# Patient Record
Sex: FEMALE | Race: WHITE | NOT HISPANIC OR LATINO
[De-identification: names, ages, dates, MRNs, and addresses within clinical notes are randomized per-mention and may not be internally consistent; named-entity substitution may affect disease eponyms.]

---

## 2017-05-04 ENCOUNTER — RESULT REVIEW (OUTPATIENT)
Age: 57
End: 2017-05-04

## 2020-08-13 ENCOUNTER — OUTPATIENT (OUTPATIENT)
Dept: OUTPATIENT SERVICES | Facility: HOSPITAL | Age: 60
LOS: 1 days | End: 2020-08-13
Payer: COMMERCIAL

## 2020-08-13 ENCOUNTER — APPOINTMENT (OUTPATIENT)
Dept: MAMMOGRAPHY | Facility: IMAGING CENTER | Age: 60
End: 2020-08-13
Payer: COMMERCIAL

## 2020-08-13 ENCOUNTER — APPOINTMENT (OUTPATIENT)
Dept: ULTRASOUND IMAGING | Facility: IMAGING CENTER | Age: 60
End: 2020-08-13
Payer: COMMERCIAL

## 2020-08-13 DIAGNOSIS — Z00.8 ENCOUNTER FOR OTHER GENERAL EXAMINATION: ICD-10-CM

## 2020-08-13 PROCEDURE — G0279: CPT | Mod: 26

## 2020-08-13 PROCEDURE — 77065 DX MAMMO INCL CAD UNI: CPT

## 2020-08-13 PROCEDURE — 77065 DX MAMMO INCL CAD UNI: CPT | Mod: 26,LT

## 2020-08-13 PROCEDURE — 76642 ULTRASOUND BREAST LIMITED: CPT | Mod: 26,LT

## 2020-08-13 PROCEDURE — 76642 ULTRASOUND BREAST LIMITED: CPT

## 2020-08-13 PROCEDURE — G0279: CPT

## 2021-03-16 ENCOUNTER — APPOINTMENT (OUTPATIENT)
Dept: OBGYN | Facility: CLINIC | Age: 61
End: 2021-03-16

## 2021-03-18 ENCOUNTER — APPOINTMENT (OUTPATIENT)
Dept: OBGYN | Facility: CLINIC | Age: 61
End: 2021-03-18
Payer: COMMERCIAL

## 2021-03-18 VITALS
HEIGHT: 61 IN | WEIGHT: 174 LBS | DIASTOLIC BLOOD PRESSURE: 82 MMHG | SYSTOLIC BLOOD PRESSURE: 122 MMHG | BODY MASS INDEX: 32.85 KG/M2

## 2021-03-18 DIAGNOSIS — Z01.411 ENCOUNTER FOR GYNECOLOGICAL EXAMINATION (GENERAL) (ROUTINE) WITH ABNORMAL FINDINGS: ICD-10-CM

## 2021-03-18 DIAGNOSIS — N63.0 UNSPECIFIED LUMP IN UNSPECIFIED BREAST: ICD-10-CM

## 2021-03-18 DIAGNOSIS — N90.5 ATROPHY OF VULVA: ICD-10-CM

## 2021-03-18 LAB
BILIRUB UR QL STRIP: NORMAL
CLARITY UR: CLEAR
COLLECTION METHOD: NORMAL
GLUCOSE UR-MCNC: NORMAL
HCG UR QL: 0.2 EU/DL
HGB UR QL STRIP.AUTO: NORMAL
KETONES UR-MCNC: NORMAL
LEUKOCYTE ESTERASE UR QL STRIP: NORMAL
NITRITE UR QL STRIP: NORMAL
PH UR STRIP: 6
PROT UR STRIP-MCNC: NORMAL
SP GR UR STRIP: 1.01

## 2021-03-18 PROCEDURE — 99072 ADDL SUPL MATRL&STAF TM PHE: CPT

## 2021-03-18 PROCEDURE — 77080 DXA BONE DENSITY AXIAL: CPT

## 2021-03-18 PROCEDURE — 82270 OCCULT BLOOD FECES: CPT

## 2021-03-18 PROCEDURE — 99396 PREV VISIT EST AGE 40-64: CPT | Mod: 25

## 2021-03-18 PROCEDURE — 99214 OFFICE O/P EST MOD 30 MIN: CPT | Mod: 25

## 2021-03-18 PROCEDURE — 81003 URINALYSIS AUTO W/O SCOPE: CPT | Mod: NC,QW

## 2021-03-20 LAB
BACTERIA UR CULT: NORMAL
HPV HIGH+LOW RISK DNA PNL CVX: NOT DETECTED
URINE CYTOLOGY: NORMAL

## 2021-03-22 LAB — CYTOLOGY CVX/VAG DOC THIN PREP: ABNORMAL

## 2021-03-31 ENCOUNTER — TRANSCRIPTION ENCOUNTER (OUTPATIENT)
Age: 61
End: 2021-03-31

## 2021-04-01 ENCOUNTER — APPOINTMENT (OUTPATIENT)
Dept: MAMMOGRAPHY | Facility: CLINIC | Age: 61
End: 2021-04-01
Payer: COMMERCIAL

## 2021-04-01 ENCOUNTER — APPOINTMENT (OUTPATIENT)
Dept: ULTRASOUND IMAGING | Facility: CLINIC | Age: 61
End: 2021-04-01
Payer: COMMERCIAL

## 2021-04-01 ENCOUNTER — RESULT REVIEW (OUTPATIENT)
Age: 61
End: 2021-04-01

## 2021-04-01 ENCOUNTER — OUTPATIENT (OUTPATIENT)
Dept: OUTPATIENT SERVICES | Facility: HOSPITAL | Age: 61
LOS: 1 days | End: 2021-04-01
Payer: COMMERCIAL

## 2021-04-01 DIAGNOSIS — N63.20 UNSPECIFIED LUMP IN THE LEFT BREAST, UNSPECIFIED QUADRANT: ICD-10-CM

## 2021-04-01 DIAGNOSIS — Z00.8 ENCOUNTER FOR OTHER GENERAL EXAMINATION: ICD-10-CM

## 2021-04-01 PROCEDURE — G0279: CPT

## 2021-04-01 PROCEDURE — G0279: CPT | Mod: 26

## 2021-04-01 PROCEDURE — 76642 ULTRASOUND BREAST LIMITED: CPT | Mod: 26,LT

## 2021-04-01 PROCEDURE — 77066 DX MAMMO INCL CAD BI: CPT | Mod: 26

## 2021-04-01 PROCEDURE — 77066 DX MAMMO INCL CAD BI: CPT

## 2021-04-01 PROCEDURE — 76642 ULTRASOUND BREAST LIMITED: CPT

## 2021-04-30 ENCOUNTER — APPOINTMENT (OUTPATIENT)
Dept: OBGYN | Facility: CLINIC | Age: 61
End: 2021-04-30

## 2021-07-04 ENCOUNTER — EMERGENCY (EMERGENCY)
Facility: HOSPITAL | Age: 61
LOS: 1 days | Discharge: HOME | End: 2021-07-04
Attending: STUDENT IN AN ORGANIZED HEALTH CARE EDUCATION/TRAINING PROGRAM
Payer: COMMERCIAL

## 2021-07-04 ENCOUNTER — EMERGENCY (EMERGENCY)
Facility: HOSPITAL | Age: 61
LOS: 1 days | Discharge: ANOTHER HOSPITAL | End: 2021-07-04
Attending: EMERGENCY MEDICINE | Admitting: EMERGENCY MEDICINE
Payer: COMMERCIAL

## 2021-07-04 VITALS
TEMPERATURE: 98 F | SYSTOLIC BLOOD PRESSURE: 151 MMHG | OXYGEN SATURATION: 97 % | HEART RATE: 102 BPM | WEIGHT: 179.9 LBS | DIASTOLIC BLOOD PRESSURE: 76 MMHG | HEIGHT: 62 IN | RESPIRATION RATE: 18 BRPM

## 2021-07-04 VITALS
DIASTOLIC BLOOD PRESSURE: 69 MMHG | HEART RATE: 75 BPM | OXYGEN SATURATION: 100 % | TEMPERATURE: 98 F | RESPIRATION RATE: 16 BRPM | SYSTOLIC BLOOD PRESSURE: 139 MMHG

## 2021-07-04 VITALS
DIASTOLIC BLOOD PRESSURE: 83 MMHG | SYSTOLIC BLOOD PRESSURE: 144 MMHG | OXYGEN SATURATION: 94 % | WEIGHT: 179.9 LBS | HEIGHT: 62 IN | RESPIRATION RATE: 18 BRPM | HEART RATE: 95 BPM | TEMPERATURE: 98 F

## 2021-07-04 DIAGNOSIS — Z98.891 HISTORY OF UTERINE SCAR FROM PREVIOUS SURGERY: Chronic | ICD-10-CM

## 2021-07-04 LAB
ALBUMIN SERPL ELPH-MCNC: 4.1 G/DL — SIGNIFICANT CHANGE UP (ref 3.3–5)
ALP SERPL-CCNC: 106 U/L — SIGNIFICANT CHANGE UP (ref 30–120)
ALT FLD-CCNC: 167 U/L DA — HIGH (ref 10–60)
ANION GAP SERPL CALC-SCNC: 14 MMOL/L — SIGNIFICANT CHANGE UP (ref 5–17)
APPEARANCE UR: CLEAR — SIGNIFICANT CHANGE UP
APTT BLD: 31.6 SEC — SIGNIFICANT CHANGE UP (ref 27.5–35.5)
AST SERPL-CCNC: 139 U/L — HIGH (ref 10–40)
BASOPHILS # BLD AUTO: 0.12 K/UL — SIGNIFICANT CHANGE UP (ref 0–0.2)
BASOPHILS NFR BLD AUTO: 0.6 % — SIGNIFICANT CHANGE UP (ref 0–2)
BILIRUB SERPL-MCNC: 1.4 MG/DL — HIGH (ref 0.2–1.2)
BILIRUB UR-MCNC: NEGATIVE — SIGNIFICANT CHANGE UP
BUN SERPL-MCNC: 16 MG/DL — SIGNIFICANT CHANGE UP (ref 7–23)
CALCIUM SERPL-MCNC: 9.8 MG/DL — SIGNIFICANT CHANGE UP (ref 8.4–10.5)
CHLORIDE SERPL-SCNC: 100 MMOL/L — SIGNIFICANT CHANGE UP (ref 96–108)
CO2 SERPL-SCNC: 21 MMOL/L — LOW (ref 22–31)
COLOR SPEC: YELLOW — SIGNIFICANT CHANGE UP
CREAT SERPL-MCNC: 0.56 MG/DL — SIGNIFICANT CHANGE UP (ref 0.5–1.3)
DIFF PNL FLD: ABNORMAL
EOSINOPHIL # BLD AUTO: 0.15 K/UL — SIGNIFICANT CHANGE UP (ref 0–0.5)
EOSINOPHIL NFR BLD AUTO: 0.8 % — SIGNIFICANT CHANGE UP (ref 0–6)
GLUCOSE SERPL-MCNC: 190 MG/DL — HIGH (ref 70–99)
GLUCOSE UR QL: 1000 MG/DL
HCT VFR BLD CALC: 48.8 % — HIGH (ref 34.5–45)
HGB BLD-MCNC: 17.2 G/DL — HIGH (ref 11.5–15.5)
IMM GRANULOCYTES NFR BLD AUTO: 1.1 % — SIGNIFICANT CHANGE UP (ref 0–1.5)
INR BLD: 0.93 RATIO — SIGNIFICANT CHANGE UP (ref 0.88–1.16)
KETONES UR-MCNC: ABNORMAL
LEUKOCYTE ESTERASE UR-ACNC: ABNORMAL
LIDOCAIN IGE QN: 121 U/L — SIGNIFICANT CHANGE UP (ref 73–393)
LYMPHOCYTES # BLD AUTO: 1.99 K/UL — SIGNIFICANT CHANGE UP (ref 1–3.3)
LYMPHOCYTES # BLD AUTO: 10.3 % — LOW (ref 13–44)
MCHC RBC-ENTMCNC: 32.9 PG — SIGNIFICANT CHANGE UP (ref 27–34)
MCHC RBC-ENTMCNC: 35.2 GM/DL — SIGNIFICANT CHANGE UP (ref 32–36)
MCV RBC AUTO: 93.3 FL — SIGNIFICANT CHANGE UP (ref 80–100)
MONOCYTES # BLD AUTO: 1.87 K/UL — HIGH (ref 0–0.9)
MONOCYTES NFR BLD AUTO: 9.7 % — SIGNIFICANT CHANGE UP (ref 2–14)
NEUTROPHILS # BLD AUTO: 15.03 K/UL — HIGH (ref 1.8–7.4)
NEUTROPHILS NFR BLD AUTO: 77.5 % — HIGH (ref 43–77)
NITRITE UR-MCNC: NEGATIVE — SIGNIFICANT CHANGE UP
NRBC # BLD: 0 /100 WBCS — SIGNIFICANT CHANGE UP (ref 0–0)
PH UR: 5 — SIGNIFICANT CHANGE UP (ref 5–8)
PLATELET # BLD AUTO: 301 K/UL — SIGNIFICANT CHANGE UP (ref 150–400)
POTASSIUM SERPL-MCNC: 4.5 MMOL/L — SIGNIFICANT CHANGE UP (ref 3.5–5.3)
POTASSIUM SERPL-SCNC: 4.5 MMOL/L — SIGNIFICANT CHANGE UP (ref 3.5–5.3)
PROT SERPL-MCNC: 7.6 G/DL — SIGNIFICANT CHANGE UP (ref 6–8.3)
PROT UR-MCNC: 30 MG/DL
PROTHROM AB SERPL-ACNC: 11.3 SEC — SIGNIFICANT CHANGE UP (ref 10.6–13.6)
RBC # BLD: 5.23 M/UL — HIGH (ref 3.8–5.2)
RBC # FLD: 12 % — SIGNIFICANT CHANGE UP (ref 10.3–14.5)
SARS-COV-2 RNA SPEC QL NAA+PROBE: SIGNIFICANT CHANGE UP
SODIUM SERPL-SCNC: 135 MMOL/L — SIGNIFICANT CHANGE UP (ref 135–145)
SP GR SPEC: 1.01 — SIGNIFICANT CHANGE UP (ref 1.01–1.02)
UROBILINOGEN FLD QL: NEGATIVE MG/DL — SIGNIFICANT CHANGE UP
WBC # BLD: 19.37 K/UL — HIGH (ref 3.8–10.5)
WBC # FLD AUTO: 19.37 K/UL — HIGH (ref 3.8–10.5)

## 2021-07-04 PROCEDURE — 71260 CT THORAX DX C+: CPT

## 2021-07-04 PROCEDURE — 85730 THROMBOPLASTIN TIME PARTIAL: CPT

## 2021-07-04 PROCEDURE — 99285 EMERGENCY DEPT VISIT HI MDM: CPT | Mod: 25

## 2021-07-04 PROCEDURE — 83690 ASSAY OF LIPASE: CPT

## 2021-07-04 PROCEDURE — 99284 EMERGENCY DEPT VISIT MOD MDM: CPT

## 2021-07-04 PROCEDURE — 99285 EMERGENCY DEPT VISIT HI MDM: CPT

## 2021-07-04 PROCEDURE — 81001 URINALYSIS AUTO W/SCOPE: CPT

## 2021-07-04 PROCEDURE — 70450 CT HEAD/BRAIN W/O DYE: CPT | Mod: 26

## 2021-07-04 PROCEDURE — 70450 CT HEAD/BRAIN W/O DYE: CPT

## 2021-07-04 PROCEDURE — 71260 CT THORAX DX C+: CPT | Mod: 26,MG

## 2021-07-04 PROCEDURE — 80053 COMPREHEN METABOLIC PANEL: CPT

## 2021-07-04 PROCEDURE — 87635 SARS-COV-2 COVID-19 AMP PRB: CPT

## 2021-07-04 PROCEDURE — 36415 COLL VENOUS BLD VENIPUNCTURE: CPT

## 2021-07-04 PROCEDURE — 96360 HYDRATION IV INFUSION INIT: CPT | Mod: XU

## 2021-07-04 PROCEDURE — 74177 CT ABD & PELVIS W/CONTRAST: CPT

## 2021-07-04 PROCEDURE — 74177 CT ABD & PELVIS W/CONTRAST: CPT | Mod: 26,MG

## 2021-07-04 PROCEDURE — 72125 CT NECK SPINE W/O DYE: CPT | Mod: 26

## 2021-07-04 PROCEDURE — G1004: CPT

## 2021-07-04 PROCEDURE — 85610 PROTHROMBIN TIME: CPT

## 2021-07-04 PROCEDURE — 85025 COMPLETE CBC W/AUTO DIFF WBC: CPT

## 2021-07-04 PROCEDURE — 72125 CT NECK SPINE W/O DYE: CPT

## 2021-07-04 RX ORDER — SODIUM CHLORIDE 9 MG/ML
1000 INJECTION INTRAMUSCULAR; INTRAVENOUS; SUBCUTANEOUS ONCE
Refills: 0 | Status: COMPLETED | OUTPATIENT
Start: 2021-07-04 | End: 2021-07-04

## 2021-07-04 RX ORDER — TRAMADOL HYDROCHLORIDE 50 MG/1
25 TABLET ORAL ONCE
Refills: 0 | Status: DISCONTINUED | OUTPATIENT
Start: 2021-07-04 | End: 2021-07-04

## 2021-07-04 RX ORDER — ACETAMINOPHEN 500 MG
650 TABLET ORAL ONCE
Refills: 0 | Status: COMPLETED | OUTPATIENT
Start: 2021-07-04 | End: 2021-07-04

## 2021-07-04 RX ORDER — LIDOCAINE 4 G/100G
1 CREAM TOPICAL ONCE
Refills: 0 | Status: COMPLETED | OUTPATIENT
Start: 2021-07-04 | End: 2021-07-04

## 2021-07-04 RX ADMIN — Medication 650 MILLIGRAM(S): at 16:05

## 2021-07-04 RX ADMIN — SODIUM CHLORIDE 1000 MILLILITER(S): 9 INJECTION INTRAMUSCULAR; INTRAVENOUS; SUBCUTANEOUS at 18:15

## 2021-07-04 RX ADMIN — LIDOCAINE 1 PATCH: 4 CREAM TOPICAL at 20:47

## 2021-07-04 RX ADMIN — Medication 650 MILLIGRAM(S): at 22:20

## 2021-07-04 RX ADMIN — Medication 650 MILLIGRAM(S): at 18:15

## 2021-07-04 RX ADMIN — SODIUM CHLORIDE 1000 MILLILITER(S): 9 INJECTION INTRAMUSCULAR; INTRAVENOUS; SUBCUTANEOUS at 17:15

## 2021-07-04 NOTE — ED PROVIDER NOTE - ATTENDING CONTRIBUTION TO CARE
62 yo f who has hx of diabetes, was on the steps of a ladder into her attic.  the ladder gave way as she was on the top of it and fell to the floor falling approx 7 feet.  she hit her head and r side. no loc no sob but has pain in r ribs and pain in her head. no abd pain no leg pain no hip pain  pmd dr caleb krause  on eval  large bmi pt who is awake alert and uncomfortable with just moving around on theexam stretcher  heent small cephalohematoma occiput  neck supple no pain no tender full rom no stingers  cor rrr   chest no pain on ap compression but there is tendre to r side, bs are equal and full no sq air no crepitance no contusions noted  abd soft nt nd no hsm no cvat  ext pt has full rom all ext no focal deformity or injury pelvis is stable  neuro gcs=15 normal  skin no contusions abrasion  plan:  trauma ct of head neck chest labs pain control re evaluation ekg xray ua

## 2021-07-04 NOTE — ED ADULT NURSE NOTE - OBJECTIVE STATEMENT
pt comes to ED s/p fall from attic steps hitting the back of her head. Pt is unsure if she had LOC. pt c.o Right flank manjeet worse with movement. pt currently on 81 ASA daily. + abrasion to left leg. pt denies cp, sob, abd pain, vomiting, numbness/weakness, or any other complaints.

## 2021-07-04 NOTE — ED ADULT TRIAGE NOTE - CHIEF COMPLAINT QUOTE
" I was climbing an attic ladder when it came off and I fell down " Pt sent from an urgent care and sent in for further evaluation for right middle back pain No LOC ? (+) small laceration left upper ankle area (+) right upper occipital contusion

## 2021-07-04 NOTE — CONSULT NOTE ADULT - ASSESSMENT
61F presenting to the ED s/p mechanically fall from ladder found to have right sided 9th and 12th rib fractures along with right sided L1 and L2 transverse process fracture.     PLAN:  - Given patient's age, radiographic imaging findings and minimal secondary survey findings, patient would benefit from a trial of pain control medication and reassessment of pain tolerance.  - Patient should trial PO tramadol to assess if patient feels comfortable with being discharged with PO pain medications.  - If pain is not well controlled with PO tramadol, then would admit patient for IV pain medication for pain management until patient's pain can be controlled with PO pain medication.    Discussed with attending surgeon Dr. Benson.    ORLANDO Parsons, PGY-2   [facility]   Acute Care Surgery   [phone]

## 2021-07-04 NOTE — ED PROVIDER NOTE - OBJECTIVE STATEMENT
62 y/o female with PMHx DM presents today due to fall. pt reports she going up attic steps in which she fell about 6 feet. pt reports she hit the back of her head, uncertain of LOC. pt reports she went to urgent care and was advised to come to ED. pt notes pain to right back, aching, radiating around right ribs, worse with movement/touch/breathing, and currently 8/10. pt currently on baby ASA daily. pt notes abrasion to left leg. pt denies cp, sob, abd pain, vomiting, numbness/weakness, or any other complaints. 60 y/o female with PMHx DM presents today due to fall. pt reports she going up attic steps in which the ladder broke and she fell about 6 feet. pt reports she hit the back of her head, uncertain of LOC. pt reports she went to urgent care and was advised to come to ED. pt notes pain to right back, aching, radiating around right ribs, worse with movement/touch/breathing, and currently 8/10. pt currently on baby ASA daily. pt notes abrasion to left leg. pt denies cp, sob, abd pain, vomiting, numbness/weakness, or any other complaints.

## 2021-07-04 NOTE — ED PROVIDER NOTE - PROGRESS NOTE DETAILS
discussed with neurosurg trauma Jaylene and trauma doctor Presley from [facility], accepting transfer.

## 2021-07-04 NOTE — ED PROVIDER NOTE - CARE PROVIDER_API CALL
Han Benson (MD)  Surgery; Surgical Critical Care  [address], [address] [address]  Phone: [phone]  Fax: [phone]  Established Patient  Follow Up Time: Routine

## 2021-07-04 NOTE — ED PROVIDER NOTE - CLINICAL SUMMARY MEDICAL DECISION MAKING FREE TEXT BOX
Deniz Gonzalez, PGY3: 61 year old female transfer from [facility] for fall from 6ft, found to have 2 rib fx and L1-2 TP fx. Neuro intact, ambulating. Plan for neurosurgery and trauma consult, pain control, reassess for dispo.

## 2021-07-04 NOTE — ED PROVIDER NOTE - ATTENDING CONTRIBUTION TO CARE
Chief Complaint   Patient presents with   • Ear Pain     right started last night, denies fevers         HPI:  Crow is a 2 year old year old male who presents with a 1-2 day h/o ear pain. with  a previous history of frequent ear infections. Associated symptoms include mom and dad state no associated symptoms. There has either been no use or no improvement from the use of OTC medications.    ROS: Negative with the exceptions listed above    Vitals:    05/31/18 1558   Pulse: 93   Resp: 22   Temp: 97.9 °F (36.6 °C)     PHYSICAL EXAMINATION:  GEN: Afebrile, NAD  EYE: PERRLA, EOMI, no conjunctival injection  EAR: Right - TM red, bulging, no landmarks or light reflex visualized and Left - canals clear and TM w/ nl landmarks, light reflex and mobility  THROAT: mmm, normal tonsils w/o erythema, exudates or petechia  NOSE: clear drainage with erythematous nasal turbinates  NECK: supple  LUNGS: CTA, no wheezing, crackles or retractions      A/P:  1. Acute suppurative otitis media of right ear without spontaneous rupture of tympanic membrane, recurrence not specified    - amoxicillin (AMOXIL) 400 MG/5ML suspension; Take 9.8 mLs by mouth 2 times daily for 10 days.  Dispense: 200 mL; Refill: 0      Recommend Tylenol or Motrin for pain and/or fever> 101deg F. Weight based dosage discussed with parent.  To call or return sooner if earache is persistent, child has continued fever, poor appetite, excessive irritability, appears lethargic.  Indications to call or return discussed with accompanying parent in detail.    Kemar Mustafa NP     I, Devin Deras, performed a history and physical exam of the patient and discussed their management with the resident and/or advanced care provider. I reviewed the resident and/or advanced care provider's note and agree with the documented findings and plan of care. I was present and available for all procedures.    61 year old female PMH DM presents to ED as transfer. Patient had fall off attic ladder approximately 6 feet onto back and head. She was seen at [facility] and found to have L1-2 transverse process fracture and R posterior rib 9th and 12th fractures. xferred for trauma eval.    Well appearing and in NAD, head normal appearing atraumatic, trachea midline, no respiratory distress, lungs cta bilaterally, rrr, no murmurs, soft NT ND abdomen, no visible extremity deformities, Alert and oriented, non focal neuro exam, skin warm and dry, normal affect and mood, tl ttp without stepoff    will have trauma surg eval no other obvious traumatic injuries on osh review of ct scans, leukocytosis likely 2/2 pain rxn no other infectious sx or signs. patient refusing opioid

## 2021-07-04 NOTE — ED PROVIDER NOTE - CARE PLAN
Principal Discharge DX:	Closed fracture of transverse process of lumbar vertebra, initial encounter  Secondary Diagnosis:	Closed fracture of multiple ribs of right side, initial encounter

## 2021-07-04 NOTE — ED PROVIDER NOTE - PATIENT PORTAL LINK FT
You can access the FollowMyHealth Patient Portal offered by [facility] by registering at the following website: [email or web address]. By joining [facility]’s FollowMyHealth portal, you will also be able to view your health information using other applications (apps) compatible with our system.

## 2021-07-04 NOTE — ED ADULT NURSE NOTE - OBJECTIVE STATEMENT
61 year old female with past medical history of diabetes presents to ED as a transfer from [facility] s/p fall from [facility] at about 1345 today. Pt is alert and oriented, speaking coherently. Pt states she does not recall the details of the event, and is unsure if she loss consciousness. She comes in c/o a "goose egg" to the top of her head, slightly to the right. [facility] CT confirms 9th and 12th posterior rib fractures, as well as displaced fractures of right L1 and L2 transverse processes. Pt transferred to [facility] ED for trauma evaluation/ spine evaluation. On exam, pt denies headache, dizziness/ lightheadedness, blurred vision. Chest rise equal and symmetrical, lung sounds clear bilaterally. Abdomen soft and non-tender, denies nausea, vomiting, diarrhea. Skin warm, dry and intact. Full strength and range of motion in all extremities, however pt's whole right side of body is sore. Radial pulses strong bilaterally.

## 2021-07-04 NOTE — ED PROVIDER NOTE - PHYSICAL EXAMINATION
Constitutional: Awake, Alert, non-toxic. NAD. Well appearing, well nourished.   HEAD: Normocephalic, (+) right parietal hematoma   EYES: PERRL, EOM intact, conjunctiva and sclera are clear bilaterally. No raccoon eyes.   ENT: No gibson sign. No rhinorrhea, normal pharynx, patent, no tonsillar exudate or enlargement, mucous membranes pink/moist, no erythema, no drooling or stridor.   NECK: Supple, non-tender  BACK: No midline or paraspinal TTP of cervical/thoracic/lumbar spine, FROM. No ecchymosis or hematomas. (+) mild right posterior rib TTP.   CARDIOVASCULAR: Normal S1, S2; regular rate and rhythm.  RESPIRATORY: Normal respiratory effort; breath sounds CTAB, no wheezes, rhonchi, or rales. Speaking in full sentences. No accessory muscle use.   ABDOMEN: Soft; non-tender, non-distended. (+) right CVAT   EXTREMITIES: Full passive and active ROM in all extremities; hip and extremities non-tender to palpation; distal pulses palpable and symmetric, no edema, no crepitus or step off  SKIN: Warm, dry; good skin turgor, (+) left anterior shin abrasion, no ecchymosis, brisk capillary refill.  NEURO: A&O x3. Sensory and motor functions are grossly intact. Speech is normal. Appearance and judgement seem appropriate for gender and age. No neurological deficits. Neurovascular sensation intact motor function 5/5 of upper and lower extremities, CN II-XII grossly intact, no ataxia, absent pronator drift, intact cerebellar function. Speech clear, without articulation or word-finding difficulties. Eyes- PERRL bilaterally. EOMs in tact. No nystagmus. No facial droop.

## 2021-07-04 NOTE — ED PROVIDER NOTE - NSFOLLOWUPINSTRUCTIONS_ED_ALL_ED_FT
Rib Fracture(s)    A rib fracture is a break or crack in one of the bones of the ribs. The ribs are a group of long, curved bones that wrap around your chest and attach to your spine. A broken or cracked rib is often painful, but most do not cause other problems and heal on their own over time. Symptoms include pain when you breath or cough or pain when pressing over the area. Breathing exercises will help keep your lungs inflated and prevent lung collapse or infection.    Take Tylenol 650mg (Two 325 mg pills) every 4-6 hours as needed for pain. Take Motrin 600 mg every 8 hours as needed for moderate pain -- take with food. Take Tramadol as prescribed for severe pain. Apply Salon paus as instructed on the box.    Use the incentive spirometer 10 times every hour while awake.     Follow up with Dr. Benson as needed for continued pain.     SEEK IMMEDIATE MEDICAL CARE IF YOU HAVE ANY OF THE FOLLOWING SYMPTOMS: fever, shortness of breath, coughing up blood, abdominal pain, nausea or vomiting, pain not controlled with medications.

## 2021-07-04 NOTE — CONSULT NOTE ADULT - SUBJECTIVE AND OBJECTIVE BOX
GENERAL SURGERY TRAUMA SERVICE - CONSULT NOTE  --------------------------------------------------------------------------------------------    TRAUMA ACTIVATION LEVEL: 3    MECHANISM OF INJURY:      [] Blunt:     [] Motor vehicle collision       [X] Fall       [] Pedestrian struck	      [] Motorcycle accident      [] Penetrating:     [] Gun shot wound       [] Stab wound    CHIEF COMPLAINT: Patient is a 61y old  Female who presents with a chief complaint of     HPI: 61F w/ PMHx of DM who presents to the ED after suffering a mechanical 6 foot fall from a ladder. Patient states she was climbing a ladder to the [facility] when the rungs of the ladder broke causing her to fall onto the floor. Positive head strike, no LOC. Patient brought to the ED due to persistent pain.    No fevers/chills, nausea/vomiting, chest pain/shortness of breath, or dizziness/lightheadedness.    PRIMARY SURVEY:   A - airway intact  B - bilateral breath sounds and good chest rise  C - initial BP  BP: 149/67 (21 @ 20:27), HR HR: 93 (21 @ 20:27), palpable pulses in all extremities  D - GCS 15 on arrival. E 4, V 5, M 6.   Exposure obtained    SECONDARY SURVEY:  General: NAD  HEENT: Normocephalic, Right posterior scalp swelling, no laceration or open wound, EOMI, PEERLA  Neck: Soft, midline trachea  Chest: Right posterior lateral chest wall tenderness  Cardiac: S1, S2, RRR  Respiratory: Bilateral breath sounds clear and equal   Abdomen: Soft, non-distended, non-tender; no rebound or guarding; no palpable masses   Groin: Normal appearing  Extremities: Palpable radial & DP pulses bilaterally. Motor and sensory grossly intact in all 4 extremities.  Back: No TTP; no palpable runoff/stepoff/deformity    ROS: 10-system review all negative except as noted in HPI.      PAST MEDICAL & SURGICAL HISTORY:  DM (diabetes mellitus)  H/O:  x3  Appendectomy    FAMILY HISTORY:  : Non-contributory, as reviewed with the patient and family.    SOCIAL HISTORY:  Never smoker, occasional alcohol drinker, never illicit drug user    ALLERGIES: No Known Allergies    HOME MEDICATIONS:  Home Medications:  aspirin 81 mg oral tablet: 1 tab(s) orally once a day (2021 15:44)  --------------------------------------------------------------------------------------------  VITALS:   T(C): 36.7 (21 @ 20:27), Max: 36.7 (21 @ 20:27)  HR: 93 (21 @ 20:27) (75 - 102)  BP: 149/67 (21 @ 20:27) (139/69 - 151/76)  RR: 17 (21 @ 20:27) (16 - 18)  SpO2: 96% (21 @ 20:27) (94% - 100%)    Height (cm): 157.5 ( @ 19:51)  Weight (kg): 81.6 ( @ 19:51)  BMI (kg/m2): 32.9 ( 19:51)  BSA (m2): 1.83 ( 19:51)    PHYSICAL EXAM:  General: NAD  HEENT: Normocephalic, Right posterior scalp swelling, no laceration or open wound, EOMI, PEERLA  Neck: Soft, midline trachea  Chest: Right posterior lateral chest wall tenderness  Cardiac: S1, S2, RRR  Respiratory: Bilateral breath sounds clear and equal   Abdomen: Soft, non-distended, non-tender; no rebound or guarding; no palpable masses   Groin: Normal appearing  Extremities: Palpable radial & DP pulses bilaterally. Motor and sensory grossly intact in all 4 extremities.  Back: No TTP; no palpable runoff/stepoff/deformity  --------------------------------------------------------------------------------------------  LABS  CBC ( 16:16)                              17.2<H>                         19.37<H>  )----------------(  301        77.5<H>% Neutrophils, 10.3<L>% Lymphocytes, ANC: 15.03<H>                              48.8<H>    BMP ( 16:16)             135     |  100     |  16    		Ca++ --      Ca 9.8                ---------------------------------( 190<H>		Mg --                 4.5     |  21<L>   |  0.56  			Ph --        LFTs ( @ 16:16)      TPro 7.6 / Alb 4.1 / TBili 1.4<H> / DBili -- / <H> / <H> / AlkPhos 106    Coags ( @ 16:16)  aPTT 31.6 / INR 0.93 / PT 11.3  --------------------------------------------------------------------------------------------  MICROBIOLOGY  Urinalysis ( 17:18):     Color: Yellow / Appearance: Clear / S.010 / pH: 5.0 / Gluc: 1000<!> / Ketones: Moderate<!> / Bili: Negative / Urobili: Negative / Protein :30<!> / Nitrites: Negative / Leuk.Est: Small<!> / RBC: 25-50<!> / WBC: 3-5 / Sq Epi:  / Non Sq Epi: Occasional / Bacteria Occasional<!>       IMAGING:    CT Abdomen and Pelvis w/ IV Cont (21 @ 17:18)  FINDINGS:  CHEST:  LUNGS AND LARGE AIRWAYS: Patent central airways. Bibasilar subsegmental atelectasis  PLEURA: No pleural effusion or pneumothorax.  VESSELS: Within normal limits.  HEART: Heart size is normal. No pericardial effusion.  MEDIASTINUM AND CARLOS: No lymphadenopathy.  CHEST WALL AND LOWER NECK: Asymmetric 1 cm rounded density in the left breast (5, 86). Asymmetric 6 mm rounded density in the right breast (5, 106).    ABDOMEN AND PELVIS:  LIVER: Steatosis.  BILE DUCTS: Normal caliber.  GALLBLADDER: Within normal limits.  SPLEEN: Within normal limits.  PANCREAS: Within normal limits.  ADRENALS: Within normal limits.  KIDNEYS/URETERS: Small left renal cyst. Additional subcentimeter left renal hypodensity too small to characterize. No hydronephrosis. Homogeneous symmetric renal enhancement.    BLADDER: Within normal limits.  REPRODUCTIVE ORGANS: Uterus and adnexa within normal limits. Punctate nonspecific uterine calcification.    BOWEL: No bowel obstruction. Appendix is not visualized. No evidence of inflammation in the pericecal region. Colonic no pneumothorax. Diverticulosis.  PERITONEUM: No ascites.  VESSELS: Within normal limits.  RETROPERITONEUM/LYMPH NODES: No lymphadenopathy.  ABDOMINAL WALL: Fat-containing umbilical and periumbilical hernia.  BONES: Acute minimally displaced fracture of the posterior right ninth rib. Acute mildly displaced fracture of the posterior right 12th rib. Mild associated subcutaneous emphysema in the posterior right chest wall. Acute minimally displaced fractures of the right L1 and L2 transverse processes. Degenerative changes.    IMPRESSION:  1. Acute fractures of the posterior right ninth and 12th ribs. Acute minimally displaced fractures of the right L1 and L2 transverse processes. No evidence of solid organ injury. No pneumothorax.  2. Nonspecific small bilateral breast nodules measuring up to 1 cm on the left. Correlation with mammography/ultrasound is recommended.

## 2021-07-04 NOTE — ED PROVIDER NOTE - OBJECTIVE STATEMENT
61 year old female PMH DM presents to ED as transfer. Patient had fall off attic ladder approximately 6 feet onto back and head. She was seen at [facility] and found to have L1-2 transverse process fracture and R posterior rib 9th and 12th fractures. She was transferred to [facility] for trauma evaluation. She c/o lower back pain. She denies headache, visual changes, cp, sob, abd pain, n/v, weakness, or numbness/tingling.

## 2021-07-04 NOTE — ED PROVIDER NOTE - NS ED ROS FT
GENERAL: no fever, no chills  EYES: no change in vision, no irritation, no discharge, no redness, no pain  HEENT: no trouble swallowing or speaking, no throat pain, no ear pain  CARDIAC: no chest pain, no palpitations   PULMONARY: no cough, no shortness of breath, no wheezing  GI: no abdominal pain, no nausea, no vomiting, no diarrhea, no constipation  : no changes in urination, no dysuria  SKIN: no rashes  NEURO: no headache, no numbness, no weakness  MSK: no joint pain, no muscle pain, +back pain, no calf pain

## 2021-07-04 NOTE — ED PROVIDER NOTE - PHYSICAL EXAMINATION
GENERAL: A&Ox3, non-toxic appearing, no acute distress  HEENT: EOMI, oral mucosa moist, normal conjunctiva, no raccoon eyes, no gibson's sign, hematoma to R occiput  RESP: CTAB, no respiratory distress, no wheezes/rhonchi/rales, speaking in full sentences  CV: RRR, no murmurs/rubs/gallops  ABDOMEN: soft, non-tender, non-distended, no guarding, no rebound tenderness  MSK: no visible deformities, no midline spinal tenderness, no step offs, R paralumbar tenderness L1-2, FROM all extremities, hesitant gait 2/2 back pain  NEURO: no focal sensory or motor deficits, CN 2-12 grossly intact, 5/5 strength in all extremities, PERRLA  SKIN: warm, normal color, well perfused, no rash, no external bleeding, no lacerations, no abrasions, no ecchymosis  PSYCH: normal affect

## 2021-07-04 NOTE — ED PROVIDER NOTE - CLINICAL SUMMARY MEDICAL DECISION MAKING FREE TEXT BOX
presents today due to fall. pt reports she going up attic steps in which she fell about 6 feet. pt reports she hit the back of her head, uncertain of LOC. pt reports she went to urgent care and was advised to come to ED. pt notes pain to right back. plan includes labs, CT head r/o intracranial bleed, CT trauma scan r/o rib fx or intrathoracic injury, re-assess

## 2021-07-04 NOTE — ED ADULT NURSE NOTE - NSIMPLEMENTINTERV_GEN_ALL_ED
Implemented All Universal Safety Interventions:  [place] to call system. Call bell, personal items and telephone within reach. Instruct patient to call for assistance. Room bathroom lighting operational. Non-slip footwear when patient is off stretcher. Physically safe environment: no spills, clutter or unnecessary equipment. Stretcher in lowest position, wheels locked, appropriate side rails in place.

## 2021-07-04 NOTE — CHART NOTE - NSCHARTNOTEFT_GEN_A_CORE
As per Mj et al (J Trauma 2008 doi: 10.1097/TA.[id number]) isolated transverse process factures are not associated with neurologic deficit or structural instability requiring spine service intervention. Conservative management per primary team is therefore appropriate.  - Please consult neurosurgery as needed if other spinal injuries are found or patient develops neurologic symptoms

## 2021-07-04 NOTE — ED PROVIDER NOTE - PROGRESS NOTE DETAILS
Deniz Gonzalez, PGY3: No intervention necessary per nsgy. Trauma surgery recommending pain control, ISS, and d/c home with follow up if tolerating Tramadol. Discussed plan w/ patient and all questions answered. Deniz Gonzalez, PGY3: Pain improved w/ Tramadol. Discussed multimodal pain control and ISS. Discussed return precautions and all questions answered. Pt in agreement w/ plan. CAOx3, NAD, VSS. Stable for d/c.

## 2021-07-05 VITALS
DIASTOLIC BLOOD PRESSURE: 76 MMHG | SYSTOLIC BLOOD PRESSURE: 132 MMHG | OXYGEN SATURATION: 98 % | RESPIRATION RATE: 18 BRPM | TEMPERATURE: 98 F | HEART RATE: 92 BPM

## 2021-07-05 PROCEDURE — 99284 EMERGENCY DEPT VISIT MOD MDM: CPT

## 2021-07-05 RX ORDER — TRAMADOL HYDROCHLORIDE 50 MG/1
1 TABLET ORAL
Qty: 28 | Refills: 0
Start: 2021-07-05 | End: 2021-07-11

## 2021-07-05 RX ADMIN — TRAMADOL HYDROCHLORIDE 25 MILLIGRAM(S): 50 TABLET ORAL at 00:41

## 2021-11-10 PROBLEM — E11.9 TYPE 2 DIABETES MELLITUS WITHOUT COMPLICATIONS: Chronic | Status: ACTIVE | Noted: 2021-07-04

## 2022-04-04 ENCOUNTER — OUTPATIENT (OUTPATIENT)
Dept: OUTPATIENT SERVICES | Facility: HOSPITAL | Age: 62
LOS: 1 days | End: 2022-04-04
Payer: COMMERCIAL

## 2022-04-04 ENCOUNTER — APPOINTMENT (OUTPATIENT)
Dept: ULTRASOUND IMAGING | Facility: CLINIC | Age: 62
End: 2022-04-04
Payer: COMMERCIAL

## 2022-04-04 ENCOUNTER — APPOINTMENT (OUTPATIENT)
Dept: MAMMOGRAPHY | Facility: CLINIC | Age: 62
End: 2022-04-04
Payer: COMMERCIAL

## 2022-04-04 DIAGNOSIS — Z98.891 HISTORY OF UTERINE SCAR FROM PREVIOUS SURGERY: Chronic | ICD-10-CM

## 2022-04-04 DIAGNOSIS — Z00.8 ENCOUNTER FOR OTHER GENERAL EXAMINATION: ICD-10-CM

## 2022-04-04 PROCEDURE — 77063 BREAST TOMOSYNTHESIS BI: CPT

## 2022-04-04 PROCEDURE — 76641 ULTRASOUND BREAST COMPLETE: CPT | Mod: 26,50

## 2022-04-04 PROCEDURE — 77063 BREAST TOMOSYNTHESIS BI: CPT | Mod: 26

## 2022-04-04 PROCEDURE — 77067 SCR MAMMO BI INCL CAD: CPT

## 2022-04-04 PROCEDURE — 76641 ULTRASOUND BREAST COMPLETE: CPT

## 2022-04-04 PROCEDURE — 77067 SCR MAMMO BI INCL CAD: CPT | Mod: 26

## 2022-04-05 ENCOUNTER — RESULT CHARGE (OUTPATIENT)
Age: 62
End: 2022-04-05

## 2022-04-05 ENCOUNTER — APPOINTMENT (OUTPATIENT)
Dept: ORTHOPEDIC SURGERY | Facility: CLINIC | Age: 62
End: 2022-04-05
Payer: COMMERCIAL

## 2022-04-05 VITALS — WEIGHT: 174 LBS | HEIGHT: 61 IN | BODY MASS INDEX: 32.85 KG/M2

## 2022-04-05 PROCEDURE — 71100 X-RAY EXAM RIBS UNI 2 VIEWS: CPT | Mod: LT

## 2022-04-05 PROCEDURE — 99203 OFFICE O/P NEW LOW 30 MIN: CPT

## 2022-04-05 PROCEDURE — 71110 X-RAY EXAM RIBS BIL 3 VIEWS: CPT | Mod: PA

## 2022-04-05 NOTE — HISTORY OF PRESENT ILLNESS
[6] : 6 [Dull/Aching] : dull/aching [Localized] : localized [Intermittent] : intermittent [Part time] : Work status: part time [de-identified] : patient complains of pain in the Left ribs \par patient went to bend down to grab something and hurt her Left ribs [] : Post Surgical Visit: no [FreeTextEntry1] : Lt rib [FreeTextEntry3] : 4/4/22 [FreeTextEntry5] : patient complains of pain in the ribs \par patient went to bend down to grab something and hurt her ibs [de-identified] : breathing

## 2022-04-05 NOTE — ASSESSMENT
[FreeTextEntry1] : Xrays negative for fracture today, normal lung markings.  She is advised that a fracture rib is not always evident on plain films.  \par She is advised rest for the next few weeks and then progress activities as tolerated.  She is advised to contact her medical Dr or report to the ER if she develops SOB.  She may take ibuprofen and tylenol for pain.  f/u prn.

## 2022-04-14 ENCOUNTER — TRANSCRIPTION ENCOUNTER (OUTPATIENT)
Age: 62
End: 2022-04-14

## 2022-04-22 ENCOUNTER — TRANSCRIPTION ENCOUNTER (OUTPATIENT)
Age: 62
End: 2022-04-22

## 2023-05-08 ENCOUNTER — OUTPATIENT (OUTPATIENT)
Dept: OUTPATIENT SERVICES | Facility: HOSPITAL | Age: 63
LOS: 1 days | End: 2023-05-08
Payer: COMMERCIAL

## 2023-05-08 ENCOUNTER — APPOINTMENT (OUTPATIENT)
Dept: MAMMOGRAPHY | Facility: CLINIC | Age: 63
End: 2023-05-08
Payer: COMMERCIAL

## 2023-05-08 ENCOUNTER — APPOINTMENT (OUTPATIENT)
Dept: ULTRASOUND IMAGING | Facility: CLINIC | Age: 63
End: 2023-05-08
Payer: COMMERCIAL

## 2023-05-08 DIAGNOSIS — Z12.39 ENCOUNTER FOR OTHER SCREENING FOR MALIGNANT NEOPLASM OF BREAST: ICD-10-CM

## 2023-05-08 DIAGNOSIS — Z98.891 HISTORY OF UTERINE SCAR FROM PREVIOUS SURGERY: Chronic | ICD-10-CM

## 2023-05-08 PROCEDURE — 77063 BREAST TOMOSYNTHESIS BI: CPT | Mod: 26

## 2023-05-08 PROCEDURE — 77063 BREAST TOMOSYNTHESIS BI: CPT

## 2023-05-08 PROCEDURE — 76641 ULTRASOUND BREAST COMPLETE: CPT | Mod: 26,50

## 2023-05-08 PROCEDURE — 77067 SCR MAMMO BI INCL CAD: CPT | Mod: 26

## 2023-05-08 PROCEDURE — 77067 SCR MAMMO BI INCL CAD: CPT

## 2023-05-08 PROCEDURE — 76641 ULTRASOUND BREAST COMPLETE: CPT

## 2023-05-17 ENCOUNTER — APPOINTMENT (OUTPATIENT)
Dept: ULTRASOUND IMAGING | Facility: CLINIC | Age: 63
End: 2023-05-17
Payer: COMMERCIAL

## 2023-05-17 ENCOUNTER — OUTPATIENT (OUTPATIENT)
Dept: OUTPATIENT SERVICES | Facility: HOSPITAL | Age: 63
LOS: 1 days | End: 2023-05-17
Payer: COMMERCIAL

## 2023-05-17 DIAGNOSIS — Z98.891 HISTORY OF UTERINE SCAR FROM PREVIOUS SURGERY: Chronic | ICD-10-CM

## 2023-05-17 DIAGNOSIS — N64.89 OTHER SPECIFIED DISORDERS OF BREAST: ICD-10-CM

## 2023-05-17 PROCEDURE — 76642 ULTRASOUND BREAST LIMITED: CPT | Mod: 26,50

## 2023-05-17 PROCEDURE — 76642 ULTRASOUND BREAST LIMITED: CPT

## 2023-05-23 ENCOUNTER — APPOINTMENT (OUTPATIENT)
Dept: ORTHOPEDIC SURGERY | Facility: CLINIC | Age: 63
End: 2023-05-23
Payer: COMMERCIAL

## 2023-05-23 VITALS — BODY MASS INDEX: 32.85 KG/M2 | HEIGHT: 61 IN | WEIGHT: 174 LBS

## 2023-05-23 PROCEDURE — 72170 X-RAY EXAM OF PELVIS: CPT

## 2023-05-23 PROCEDURE — 72100 X-RAY EXAM L-S SPINE 2/3 VWS: CPT

## 2023-05-23 PROCEDURE — 99213 OFFICE O/P EST LOW 20 MIN: CPT

## 2023-05-23 NOTE — HISTORY OF PRESENT ILLNESS
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 Nothing down leg. Has had back issues in the past [] : no [FreeTextEntry1] : LT Hip [FreeTextEntry5] : Lower back pain started years ago. pain comes and goes. Pt fell 2 years ago and broke her ribs. Her ribs healed by now Pt has pain in her lower back. pt was treated last year for the pain and it went away. Pt has a new flair up which started last week after she went to try and lift her father.  Pt went to  and was given a medrol dose pack and cyclobenzaprine

## 2023-05-23 NOTE — ASSESSMENT
[FreeTextEntry1] : Suggest course of PT, NSAIDs
[FreeTextEntry3] : Written by Peg Spears, acting as a scribe for Dr. Donis Jordan.\par This note accurately reflects the work and decision made by me.\par

## 2023-05-23 NOTE — PHYSICAL EXAM
[] : achilles and patella reflexes are symmetrical [Disc space narrowing] : Disc space narrowing [AP] : anteroposterior [There are no fractures, subluxations or dislocations. No significant abnormalities are seen] : There are no fractures, subluxations or dislocations. No significant abnormalities are seen

## 2023-07-10 ENCOUNTER — APPOINTMENT (OUTPATIENT)
Dept: ORTHOPEDIC SURGERY | Facility: CLINIC | Age: 63
End: 2023-07-10
Payer: COMMERCIAL

## 2023-07-10 VITALS — HEIGHT: 61 IN | BODY MASS INDEX: 32.85 KG/M2 | WEIGHT: 174 LBS

## 2023-07-10 PROCEDURE — 99213 OFFICE O/P EST LOW 20 MIN: CPT

## 2023-07-10 NOTE — HISTORY OF PRESENT ILLNESS
[Dull/Aching] : dull/aching [Tightness] : tightness [Walking/activity] : walking/activity [de-identified] : Has been doing a lot of lifting of her elderly father, developed low back pain off to left hip. Nothing down leg. Has had back issues in the past [Lower back] : lower back [Sudden] : sudden [8] : 8 [5] : 5 [Sharp] : sharp [Intermittent] : intermittent [Nothing helps with pain getting better] : Nothing helps with pain getting better [] : no [FreeTextEntry1] : LT Hip [FreeTextEntry5] : Lower back pain started years ago. pain comes and goes. Pt fell 2 years ago and broke her ribs. Her ribs healed by now Pt has pain in her lower back. pt was treated last year for the pain and it went away. Pt has a new flair up which started last week after she went to try and lift her father.  Pt went to  and was given a medrol dose pack and cyclobenzaprine

## 2023-07-12 ENCOUNTER — EMERGENCY (EMERGENCY)
Facility: HOSPITAL | Age: 63
LOS: 1 days | Discharge: ANOTHER HOSPITAL | End: 2023-07-12
Attending: EMERGENCY MEDICINE | Admitting: EMERGENCY MEDICINE
Payer: COMMERCIAL

## 2023-07-12 VITALS
RESPIRATION RATE: 20 BRPM | WEIGHT: 165.35 LBS | SYSTOLIC BLOOD PRESSURE: 103 MMHG | HEART RATE: 108 BPM | OXYGEN SATURATION: 96 % | HEIGHT: 63 IN | DIASTOLIC BLOOD PRESSURE: 72 MMHG | TEMPERATURE: 98 F

## 2023-07-12 DIAGNOSIS — Z98.891 HISTORY OF UTERINE SCAR FROM PREVIOUS SURGERY: Chronic | ICD-10-CM

## 2023-07-12 LAB
ALBUMIN SERPL ELPH-MCNC: 4.3 G/DL — SIGNIFICANT CHANGE UP (ref 3.3–5)
ALP SERPL-CCNC: 94 U/L — SIGNIFICANT CHANGE UP (ref 30–120)
ALT FLD-CCNC: 59 U/L DA — SIGNIFICANT CHANGE UP (ref 10–60)
ANION GAP SERPL CALC-SCNC: 12 MMOL/L — SIGNIFICANT CHANGE UP (ref 5–17)
AST SERPL-CCNC: 42 U/L — HIGH (ref 10–40)
BASOPHILS # BLD AUTO: 0.1 K/UL — SIGNIFICANT CHANGE UP (ref 0–0.2)
BASOPHILS NFR BLD AUTO: 0.5 % — SIGNIFICANT CHANGE UP (ref 0–2)
BILIRUB SERPL-MCNC: 2.4 MG/DL — HIGH (ref 0.2–1.2)
BUN SERPL-MCNC: 23 MG/DL — SIGNIFICANT CHANGE UP (ref 7–23)
CALCIUM SERPL-MCNC: 9.9 MG/DL — SIGNIFICANT CHANGE UP (ref 8.4–10.5)
CHLORIDE SERPL-SCNC: 98 MMOL/L — SIGNIFICANT CHANGE UP (ref 96–108)
CO2 SERPL-SCNC: 25 MMOL/L — SIGNIFICANT CHANGE UP (ref 22–31)
CREAT SERPL-MCNC: 0.68 MG/DL — SIGNIFICANT CHANGE UP (ref 0.5–1.3)
D DIMER BLD IA.RAPID-MCNC: <150 NG/ML DDU — SIGNIFICANT CHANGE UP
EGFR: 97 ML/MIN/1.73M2 — SIGNIFICANT CHANGE UP
EOSINOPHIL # BLD AUTO: 0.01 K/UL — SIGNIFICANT CHANGE UP (ref 0–0.5)
EOSINOPHIL NFR BLD AUTO: 0 % — SIGNIFICANT CHANGE UP (ref 0–6)
GLUCOSE SERPL-MCNC: 199 MG/DL — HIGH (ref 70–99)
HCT VFR BLD CALC: 45.5 % — HIGH (ref 34.5–45)
HGB BLD-MCNC: 16 G/DL — HIGH (ref 11.5–15.5)
IMM GRANULOCYTES NFR BLD AUTO: 1.4 % — HIGH (ref 0–0.9)
LIDOCAIN IGE QN: 27 U/L — LOW (ref 73–393)
LYMPHOCYTES # BLD AUTO: 10.6 % — LOW (ref 13–44)
LYMPHOCYTES # BLD AUTO: 2.2 K/UL — SIGNIFICANT CHANGE UP (ref 1–3.3)
MAGNESIUM SERPL-MCNC: 2 MG/DL — SIGNIFICANT CHANGE UP (ref 1.6–2.6)
MCHC RBC-ENTMCNC: 32.8 PG — SIGNIFICANT CHANGE UP (ref 27–34)
MCHC RBC-ENTMCNC: 35.2 GM/DL — SIGNIFICANT CHANGE UP (ref 32–36)
MCV RBC AUTO: 93.2 FL — SIGNIFICANT CHANGE UP (ref 80–100)
MONOCYTES # BLD AUTO: 1.85 K/UL — HIGH (ref 0–0.9)
MONOCYTES NFR BLD AUTO: 8.9 % — SIGNIFICANT CHANGE UP (ref 2–14)
NEUTROPHILS # BLD AUTO: 16.31 K/UL — HIGH (ref 1.8–7.4)
NEUTROPHILS NFR BLD AUTO: 78.6 % — HIGH (ref 43–77)
NRBC # BLD: 0 /100 WBCS — SIGNIFICANT CHANGE UP (ref 0–0)
NT-PROBNP SERPL-SCNC: 2247 PG/ML — HIGH (ref 0–125)
PLATELET # BLD AUTO: 355 K/UL — SIGNIFICANT CHANGE UP (ref 150–400)
POTASSIUM SERPL-MCNC: 4.1 MMOL/L — SIGNIFICANT CHANGE UP (ref 3.5–5.3)
POTASSIUM SERPL-SCNC: 4.1 MMOL/L — SIGNIFICANT CHANGE UP (ref 3.5–5.3)
PROT SERPL-MCNC: 7.7 G/DL — SIGNIFICANT CHANGE UP (ref 6–8.3)
RBC # BLD: 4.88 M/UL — SIGNIFICANT CHANGE UP (ref 3.8–5.2)
RBC # FLD: 12.3 % — SIGNIFICANT CHANGE UP (ref 10.3–14.5)
SODIUM SERPL-SCNC: 135 MMOL/L — SIGNIFICANT CHANGE UP (ref 135–145)
TROPONIN I, HIGH SENSITIVITY RESULT: 5134.7 NG/L — HIGH
WBC # BLD: 20.76 K/UL — HIGH (ref 3.8–10.5)
WBC # FLD AUTO: 20.76 K/UL — HIGH (ref 3.8–10.5)

## 2023-07-12 PROCEDURE — 71045 X-RAY EXAM CHEST 1 VIEW: CPT | Mod: 26

## 2023-07-12 PROCEDURE — 93010 ELECTROCARDIOGRAM REPORT: CPT | Mod: 76,59

## 2023-07-12 PROCEDURE — 99285 EMERGENCY DEPT VISIT HI MDM: CPT

## 2023-07-12 RX ORDER — HEPARIN SODIUM 5000 [USP'U]/ML
4400 INJECTION INTRAVENOUS; SUBCUTANEOUS EVERY 6 HOURS
Refills: 0 | Status: DISCONTINUED | OUTPATIENT
Start: 2023-07-12 | End: 2023-07-16

## 2023-07-12 RX ORDER — ONDANSETRON 8 MG/1
4 TABLET, FILM COATED ORAL ONCE
Refills: 0 | Status: COMPLETED | OUTPATIENT
Start: 2023-07-12 | End: 2023-07-12

## 2023-07-12 RX ORDER — FENTANYL CITRATE 50 UG/ML
50 INJECTION INTRAVENOUS ONCE
Refills: 0 | Status: DISCONTINUED | OUTPATIENT
Start: 2023-07-12 | End: 2023-07-12

## 2023-07-12 RX ORDER — ASPIRIN/CALCIUM CARB/MAGNESIUM 324 MG
162 TABLET ORAL ONCE
Refills: 0 | Status: COMPLETED | OUTPATIENT
Start: 2023-07-12 | End: 2023-07-12

## 2023-07-12 RX ORDER — FAMOTIDINE 10 MG/ML
20 INJECTION INTRAVENOUS ONCE
Refills: 0 | Status: COMPLETED | OUTPATIENT
Start: 2023-07-12 | End: 2023-07-12

## 2023-07-12 RX ORDER — HEPARIN SODIUM 5000 [USP'U]/ML
4400 INJECTION INTRAVENOUS; SUBCUTANEOUS ONCE
Refills: 0 | Status: COMPLETED | OUTPATIENT
Start: 2023-07-12 | End: 2023-07-13

## 2023-07-12 RX ORDER — HEPARIN SODIUM 5000 [USP'U]/ML
INJECTION INTRAVENOUS; SUBCUTANEOUS
Qty: 25000 | Refills: 0 | Status: DISCONTINUED | OUTPATIENT
Start: 2023-07-12 | End: 2023-07-16

## 2023-07-12 RX ORDER — TICAGRELOR 90 MG/1
180 TABLET ORAL ONCE
Refills: 0 | Status: COMPLETED | OUTPATIENT
Start: 2023-07-12 | End: 2023-07-12

## 2023-07-12 RX ADMIN — FAMOTIDINE 20 MILLIGRAM(S): 10 INJECTION INTRAVENOUS at 22:47

## 2023-07-12 RX ADMIN — ONDANSETRON 4 MILLIGRAM(S): 8 TABLET, FILM COATED ORAL at 22:47

## 2023-07-12 RX ADMIN — Medication 162 MILLIGRAM(S): at 22:47

## 2023-07-12 NOTE — ED ADULT NURSE NOTE - OBJECTIVE STATEMENT
pt c/o upper abd/epigastric pain that started earlier today; pt reports having increased stress at home, (a death in the family) and she thinks it may be anxiety / stress related. skin warm and dry. GONZALEZ. respirations even and unlabored. reports vomiting x2 today

## 2023-07-12 NOTE — ED PROVIDER NOTE - OBJECTIVE STATEMENT
63 y.o. F c/o n/v, cp, abd discomfort - states this morning around 9am was advised that her nephew was found dead in bed, pt went to his home, was hot in the house, was there most of the day, early on developed discomfort left lower chest, nausea, persisted throughout the day, then around 7p tonight felt a lot worse, felt weak, vomited, continued chest pressure, family convinced pt to come in tonight, pt has h/o DM, reports 5d dry cough and being dxd with bronchitis yesterday and put on steroid, pt is non-smoker, reports her mother  of MI at same age as her now

## 2023-07-12 NOTE — ED PROVIDER NOTE - PROGRESS NOTE DETAILS
call to transfer center for NSTEMI pt would be accepted for transfer to [facility] by cardiology, however bed issue possible, may need to redirect, will call back ASAP as pt does require transfer, concern for worsening cardiac condition and very limited resources at [place] accepted to [facility] ED, awaiting transfer

## 2023-07-12 NOTE — ED PROVIDER NOTE - CLINICAL SUMMARY MEDICAL DECISION MAKING FREE TEXT BOX
63 y.o. F with DM with 1d left lower chest pressure, a lot of stressors including death of a nephew this morning, GI sensation with cp, worsening tonight - iv, labs, r/o acs, ekg, trop, asa, pepcid, zofran, reassess

## 2023-07-13 ENCOUNTER — INPATIENT (INPATIENT)
Facility: HOSPITAL | Age: 63
LOS: 3 days | Discharge: HOME | DRG: 280 | End: 2023-07-17
Attending: STUDENT IN AN ORGANIZED HEALTH CARE EDUCATION/TRAINING PROGRAM | Admitting: INTERNAL MEDICINE
Payer: COMMERCIAL

## 2023-07-13 VITALS
TEMPERATURE: 99 F | OXYGEN SATURATION: 98 % | WEIGHT: 169.98 LBS | HEART RATE: 92 BPM | DIASTOLIC BLOOD PRESSURE: 66 MMHG | SYSTOLIC BLOOD PRESSURE: 111 MMHG | HEIGHT: 63 IN | RESPIRATION RATE: 20 BRPM

## 2023-07-13 DIAGNOSIS — Z98.891 HISTORY OF UTERINE SCAR FROM PREVIOUS SURGERY: Chronic | ICD-10-CM

## 2023-07-13 DIAGNOSIS — I21.4 NON-ST ELEVATION (NSTEMI) MYOCARDIAL INFARCTION: ICD-10-CM

## 2023-07-13 LAB
ALBUMIN SERPL ELPH-MCNC: 4.4 G/DL — SIGNIFICANT CHANGE UP (ref 3.3–5)
ALP SERPL-CCNC: 80 U/L — SIGNIFICANT CHANGE UP (ref 40–120)
ALT FLD-CCNC: 41 U/L — SIGNIFICANT CHANGE UP (ref 10–45)
ANION GAP SERPL CALC-SCNC: 23 MMOL/L — HIGH (ref 5–17)
ANISOCYTOSIS BLD QL: SLIGHT — SIGNIFICANT CHANGE UP
APTT BLD: 30.3 SEC — SIGNIFICANT CHANGE UP (ref 27.5–35.5)
APTT BLD: 34.2 SEC — SIGNIFICANT CHANGE UP (ref 27.5–35.5)
APTT BLD: 51.3 SEC — HIGH (ref 27.5–35.5)
AST SERPL-CCNC: 40 U/L — SIGNIFICANT CHANGE UP (ref 10–40)
BASOPHILS # BLD AUTO: 0 K/UL — SIGNIFICANT CHANGE UP (ref 0–0.2)
BASOPHILS NFR BLD AUTO: 0 % — SIGNIFICANT CHANGE UP (ref 0–2)
BILIRUB SERPL-MCNC: 3 MG/DL — HIGH (ref 0.2–1.2)
BUN SERPL-MCNC: 16 MG/DL — SIGNIFICANT CHANGE UP (ref 7–23)
CALCIUM SERPL-MCNC: 9.4 MG/DL — SIGNIFICANT CHANGE UP (ref 8.4–10.5)
CHLORIDE SERPL-SCNC: 99 MMOL/L — SIGNIFICANT CHANGE UP (ref 96–108)
CO2 SERPL-SCNC: 15 MMOL/L — LOW (ref 22–31)
CREAT SERPL-MCNC: 0.48 MG/DL — LOW (ref 0.5–1.3)
DACRYOCYTES BLD QL SMEAR: SLIGHT — SIGNIFICANT CHANGE UP
EGFR: 106 ML/MIN/1.73M2 — SIGNIFICANT CHANGE UP
EOSINOPHIL # BLD AUTO: 0 K/UL — SIGNIFICANT CHANGE UP (ref 0–0.5)
EOSINOPHIL NFR BLD AUTO: 0 % — SIGNIFICANT CHANGE UP (ref 0–6)
GLUCOSE BLDC GLUCOMTR-MCNC: 148 MG/DL — HIGH (ref 70–99)
GLUCOSE BLDC GLUCOMTR-MCNC: 155 MG/DL — HIGH (ref 70–99)
GLUCOSE SERPL-MCNC: 177 MG/DL — HIGH (ref 70–99)
HCT VFR BLD CALC: 45.8 % — HIGH (ref 34.5–45)
HGB BLD-MCNC: 16 G/DL — HIGH (ref 11.5–15.5)
INR BLD: 1.08 RATIO — SIGNIFICANT CHANGE UP (ref 0.88–1.16)
INR BLD: 1.09 RATIO — SIGNIFICANT CHANGE UP (ref 0.88–1.16)
LYMPHOCYTES # BLD AUTO: 21.5 % — SIGNIFICANT CHANGE UP (ref 13–44)
LYMPHOCYTES # BLD AUTO: 4 K/UL — HIGH (ref 1–3.3)
MACROCYTES BLD QL: SLIGHT — SIGNIFICANT CHANGE UP
MANUAL SMEAR VERIFICATION: SIGNIFICANT CHANGE UP
MCHC RBC-ENTMCNC: 32.7 PG — SIGNIFICANT CHANGE UP (ref 27–34)
MCHC RBC-ENTMCNC: 34.9 GM/DL — SIGNIFICANT CHANGE UP (ref 32–36)
MCV RBC AUTO: 93.7 FL — SIGNIFICANT CHANGE UP (ref 80–100)
MICROCYTES BLD QL: SLIGHT — SIGNIFICANT CHANGE UP
MONOCYTES # BLD AUTO: 0.93 K/UL — HIGH (ref 0–0.9)
MONOCYTES NFR BLD AUTO: 5 % — SIGNIFICANT CHANGE UP (ref 2–14)
NEUTROPHILS # BLD AUTO: 13.66 K/UL — HIGH (ref 1.8–7.4)
NEUTROPHILS NFR BLD AUTO: 73.5 % — SIGNIFICANT CHANGE UP (ref 43–77)
NT-PROBNP SERPL-SCNC: 4509 PG/ML — HIGH (ref 0–300)
OVALOCYTES BLD QL SMEAR: SLIGHT — SIGNIFICANT CHANGE UP
PLAT MORPH BLD: NORMAL — SIGNIFICANT CHANGE UP
PLATELET # BLD AUTO: 343 K/UL — SIGNIFICANT CHANGE UP (ref 150–400)
POIKILOCYTOSIS BLD QL AUTO: SLIGHT — SIGNIFICANT CHANGE UP
POLYCHROMASIA BLD QL SMEAR: SLIGHT — SIGNIFICANT CHANGE UP
POTASSIUM SERPL-MCNC: 3.8 MMOL/L — SIGNIFICANT CHANGE UP (ref 3.5–5.3)
POTASSIUM SERPL-SCNC: 3.8 MMOL/L — SIGNIFICANT CHANGE UP (ref 3.5–5.3)
PROT SERPL-MCNC: 6.9 G/DL — SIGNIFICANT CHANGE UP (ref 6–8.3)
PROTHROM AB SERPL-ACNC: 12.6 SEC — SIGNIFICANT CHANGE UP (ref 10.5–13.4)
PROTHROM AB SERPL-ACNC: 12.7 SEC — SIGNIFICANT CHANGE UP (ref 10.5–13.4)
RBC # BLD: 4.89 M/UL — SIGNIFICANT CHANGE UP (ref 3.8–5.2)
RBC # FLD: 12.5 % — SIGNIFICANT CHANGE UP (ref 10.3–14.5)
RBC BLD AUTO: ABNORMAL
SODIUM SERPL-SCNC: 137 MMOL/L — SIGNIFICANT CHANGE UP (ref 135–145)
TROPONIN T, HIGH SENSITIVITY RESULT: 421 NG/L — HIGH (ref 0–51)
TROPONIN T, HIGH SENSITIVITY RESULT: 492 NG/L — HIGH (ref 0–51)
WBC # BLD: 18.59 K/UL — HIGH (ref 3.8–10.5)
WBC # FLD AUTO: 18.59 K/UL — HIGH (ref 3.8–10.5)

## 2023-07-13 PROCEDURE — 83690 ASSAY OF LIPASE: CPT

## 2023-07-13 PROCEDURE — 99285 EMERGENCY DEPT VISIT HI MDM: CPT

## 2023-07-13 PROCEDURE — 85730 THROMBOPLASTIN TIME PARTIAL: CPT

## 2023-07-13 PROCEDURE — 85025 COMPLETE CBC W/AUTO DIFF WBC: CPT

## 2023-07-13 PROCEDURE — 83880 ASSAY OF NATRIURETIC PEPTIDE: CPT

## 2023-07-13 PROCEDURE — 84484 ASSAY OF TROPONIN QUANT: CPT

## 2023-07-13 PROCEDURE — 96374 THER/PROPH/DIAG INJ IV PUSH: CPT

## 2023-07-13 PROCEDURE — 80053 COMPREHEN METABOLIC PANEL: CPT

## 2023-07-13 PROCEDURE — 36415 COLL VENOUS BLD VENIPUNCTURE: CPT

## 2023-07-13 PROCEDURE — 96375 TX/PRO/DX INJ NEW DRUG ADDON: CPT

## 2023-07-13 PROCEDURE — 76700 US EXAM ABDOM COMPLETE: CPT | Mod: 26

## 2023-07-13 PROCEDURE — 99152 MOD SED SAME PHYS/QHP 5/>YRS: CPT

## 2023-07-13 PROCEDURE — 82962 GLUCOSE BLOOD TEST: CPT

## 2023-07-13 PROCEDURE — 93306 TTE W/DOPPLER COMPLETE: CPT | Mod: 26

## 2023-07-13 PROCEDURE — 99285 EMERGENCY DEPT VISIT HI MDM: CPT | Mod: 25

## 2023-07-13 PROCEDURE — 83735 ASSAY OF MAGNESIUM: CPT

## 2023-07-13 PROCEDURE — 71045 X-RAY EXAM CHEST 1 VIEW: CPT

## 2023-07-13 PROCEDURE — 96376 TX/PRO/DX INJ SAME DRUG ADON: CPT

## 2023-07-13 PROCEDURE — 85610 PROTHROMBIN TIME: CPT

## 2023-07-13 PROCEDURE — 93458 L HRT ARTERY/VENTRICLE ANGIO: CPT | Mod: 26

## 2023-07-13 PROCEDURE — 93005 ELECTROCARDIOGRAM TRACING: CPT

## 2023-07-13 PROCEDURE — 85379 FIBRIN DEGRADATION QUANT: CPT

## 2023-07-13 RX ORDER — METFORMIN HYDROCHLORIDE 850 MG/1
1 TABLET ORAL
Refills: 0 | DISCHARGE

## 2023-07-13 RX ORDER — VALSARTAN 80 MG/1
20 TABLET ORAL
Refills: 0 | Status: DISCONTINUED | OUTPATIENT
Start: 2023-07-14 | End: 2023-07-17

## 2023-07-13 RX ORDER — SIMVASTATIN 20 MG/1
1 TABLET, FILM COATED ORAL
Refills: 0 | DISCHARGE

## 2023-07-13 RX ORDER — ATORVASTATIN CALCIUM 80 MG/1
40 TABLET, FILM COATED ORAL AT BEDTIME
Refills: 0 | Status: DISCONTINUED | OUTPATIENT
Start: 2023-07-13 | End: 2023-07-14

## 2023-07-13 RX ORDER — PANTOPRAZOLE SODIUM 20 MG/1
40 TABLET, DELAYED RELEASE ORAL
Refills: 0 | Status: DISCONTINUED | OUTPATIENT
Start: 2023-07-13 | End: 2023-07-17

## 2023-07-13 RX ORDER — GLUCAGON INJECTION, SOLUTION 0.5 MG/.1ML
1 INJECTION, SOLUTION SUBCUTANEOUS ONCE
Refills: 0 | Status: DISCONTINUED | OUTPATIENT
Start: 2023-07-13 | End: 2023-07-17

## 2023-07-13 RX ORDER — LISINOPRIL 2.5 MG/1
10 TABLET ORAL DAILY
Refills: 0 | Status: DISCONTINUED | OUTPATIENT
Start: 2023-07-13 | End: 2023-07-13

## 2023-07-13 RX ORDER — RAMIPRIL 5 MG
0 CAPSULE ORAL
Refills: 0 | DISCHARGE

## 2023-07-13 RX ORDER — DEXTROSE 50 % IN WATER 50 %
25 SYRINGE (ML) INTRAVENOUS ONCE
Refills: 0 | Status: DISCONTINUED | OUTPATIENT
Start: 2023-07-13 | End: 2023-07-17

## 2023-07-13 RX ORDER — IPRATROPIUM/ALBUTEROL SULFATE 18-103MCG
3 AEROSOL WITH ADAPTER (GRAM) INHALATION EVERY 6 HOURS
Refills: 0 | Status: DISCONTINUED | OUTPATIENT
Start: 2023-07-13 | End: 2023-07-17

## 2023-07-13 RX ORDER — DEXTROSE 50 % IN WATER 50 %
15 SYRINGE (ML) INTRAVENOUS ONCE
Refills: 0 | Status: DISCONTINUED | OUTPATIENT
Start: 2023-07-13 | End: 2023-07-17

## 2023-07-13 RX ORDER — HEPARIN SODIUM 5000 [USP'U]/ML
INJECTION INTRAVENOUS; SUBCUTANEOUS
Qty: 25000 | Refills: 0 | Status: DISCONTINUED | OUTPATIENT
Start: 2023-07-13 | End: 2023-07-13

## 2023-07-13 RX ORDER — HEPARIN SODIUM 5000 [USP'U]/ML
4700 INJECTION INTRAVENOUS; SUBCUTANEOUS EVERY 6 HOURS
Refills: 0 | Status: DISCONTINUED | OUTPATIENT
Start: 2023-07-13 | End: 2023-07-13

## 2023-07-13 RX ORDER — ASPIRIN/CALCIUM CARB/MAGNESIUM 324 MG
81 TABLET ORAL DAILY
Refills: 0 | Status: DISCONTINUED | OUTPATIENT
Start: 2023-07-13 | End: 2023-07-14

## 2023-07-13 RX ORDER — INSULIN LISPRO 100/ML
VIAL (ML) SUBCUTANEOUS AT BEDTIME
Refills: 0 | Status: DISCONTINUED | OUTPATIENT
Start: 2023-07-13 | End: 2023-07-17

## 2023-07-13 RX ORDER — METOPROLOL TARTRATE 50 MG
25 TABLET ORAL ONCE
Refills: 0 | Status: DISCONTINUED | OUTPATIENT
Start: 2023-07-13 | End: 2023-07-13

## 2023-07-13 RX ORDER — SEMAGLUTIDE 0.68 MG/ML
1 INJECTION, SOLUTION SUBCUTANEOUS
Refills: 0 | DISCHARGE

## 2023-07-13 RX ORDER — SODIUM CHLORIDE 9 MG/ML
1000 INJECTION INTRAMUSCULAR; INTRAVENOUS; SUBCUTANEOUS
Refills: 0 | Status: DISCONTINUED | OUTPATIENT
Start: 2023-07-13 | End: 2023-07-17

## 2023-07-13 RX ORDER — SODIUM CHLORIDE 9 MG/ML
1000 INJECTION, SOLUTION INTRAVENOUS
Refills: 0 | Status: DISCONTINUED | OUTPATIENT
Start: 2023-07-13 | End: 2023-07-17

## 2023-07-13 RX ORDER — METFORMIN HYDROCHLORIDE 850 MG/1
4 TABLET ORAL
Refills: 0 | DISCHARGE

## 2023-07-13 RX ORDER — SEMAGLUTIDE 0.68 MG/ML
2 INJECTION, SOLUTION SUBCUTANEOUS
Refills: 0 | DISCHARGE

## 2023-07-13 RX ORDER — ONDANSETRON 8 MG/1
4 TABLET, FILM COATED ORAL ONCE
Refills: 0 | Status: COMPLETED | OUTPATIENT
Start: 2023-07-13 | End: 2023-07-13

## 2023-07-13 RX ORDER — FUROSEMIDE 40 MG
40 TABLET ORAL DAILY
Refills: 0 | Status: DISCONTINUED | OUTPATIENT
Start: 2023-07-13 | End: 2023-07-15

## 2023-07-13 RX ORDER — DEXTROSE 50 % IN WATER 50 %
12.5 SYRINGE (ML) INTRAVENOUS ONCE
Refills: 0 | Status: DISCONTINUED | OUTPATIENT
Start: 2023-07-13 | End: 2023-07-17

## 2023-07-13 RX ORDER — FENTANYL CITRATE 50 UG/ML
50 INJECTION INTRAVENOUS ONCE
Refills: 0 | Status: DISCONTINUED | OUTPATIENT
Start: 2023-07-13 | End: 2023-07-13

## 2023-07-13 RX ORDER — CANAGLIFLOZIN 100 MG/1
1 TABLET, FILM COATED ORAL
Refills: 0 | DISCHARGE

## 2023-07-13 RX ORDER — INSULIN LISPRO 100/ML
VIAL (ML) SUBCUTANEOUS
Refills: 0 | Status: DISCONTINUED | OUTPATIENT
Start: 2023-07-13 | End: 2023-07-17

## 2023-07-13 RX ADMIN — Medication 1 MILLIGRAM(S): at 05:21

## 2023-07-13 RX ADMIN — TICAGRELOR 180 MILLIGRAM(S): 90 TABLET ORAL at 00:08

## 2023-07-13 RX ADMIN — HEPARIN SODIUM 900 UNIT(S)/HR: 5000 INJECTION INTRAVENOUS; SUBCUTANEOUS at 00:09

## 2023-07-13 RX ADMIN — HEPARIN SODIUM 4700 UNIT(S): 5000 INJECTION INTRAVENOUS; SUBCUTANEOUS at 08:05

## 2023-07-13 RX ADMIN — FENTANYL CITRATE 50 MICROGRAM(S): 50 INJECTION INTRAVENOUS at 00:15

## 2023-07-13 RX ADMIN — FENTANYL CITRATE 50 MICROGRAM(S): 50 INJECTION INTRAVENOUS at 02:14

## 2023-07-13 RX ADMIN — HEPARIN SODIUM 950 UNIT(S)/HR: 5000 INJECTION INTRAVENOUS; SUBCUTANEOUS at 08:06

## 2023-07-13 RX ADMIN — HEPARIN SODIUM 900 UNIT(S)/HR: 5000 INJECTION INTRAVENOUS; SUBCUTANEOUS at 08:46

## 2023-07-13 RX ADMIN — Medication 10 MILLIGRAM(S): at 18:45

## 2023-07-13 RX ADMIN — FENTANYL CITRATE 50 MICROGRAM(S): 50 INJECTION INTRAVENOUS at 02:25

## 2023-07-13 RX ADMIN — ATORVASTATIN CALCIUM 40 MILLIGRAM(S): 80 TABLET, FILM COATED ORAL at 21:13

## 2023-07-13 RX ADMIN — ONDANSETRON 4 MILLIGRAM(S): 8 TABLET, FILM COATED ORAL at 04:30

## 2023-07-13 RX ADMIN — HEPARIN SODIUM 1050 UNIT(S)/HR: 5000 INJECTION INTRAVENOUS; SUBCUTANEOUS at 14:20

## 2023-07-13 RX ADMIN — HEPARIN SODIUM 4400 UNIT(S): 5000 INJECTION INTRAVENOUS; SUBCUTANEOUS at 00:09

## 2023-07-13 RX ADMIN — FENTANYL CITRATE 50 MICROGRAM(S): 50 INJECTION INTRAVENOUS at 02:16

## 2023-07-13 RX ADMIN — Medication 40 MILLIGRAM(S): at 18:44

## 2023-07-13 RX ADMIN — SODIUM CHLORIDE 75 MILLILITER(S): 9 INJECTION INTRAMUSCULAR; INTRAVENOUS; SUBCUTANEOUS at 15:35

## 2023-07-13 NOTE — ED ADULT NURSE REASSESSMENT NOTE - NS ED NURSE REASSESS COMMENT FT1
Pt aPTT resulted while pt was at echo, due to patient not being on unit the Heparin was unable to be adjusted. Pt sent to cath lab from echo and [facility] nurse Malia haas.

## 2023-07-13 NOTE — ED ADULT NURSE NOTE - NSFALLUNIVINTERV_ED_ALL_ED
Bed/Stretcher in lowest position, wheels locked, appropriate side rails in place/Call bell, personal items and telephone in reach/Instruct patient to call for assistance before getting out of bed/chair/stretcher/Non-slip footwear applied when patient is off stretcher/[place] to call system/Physically safe environment - no spills, clutter or unnecessary equipment/Purposeful proactive rounding/Room/bathroom lighting operational, light cord in reach

## 2023-07-13 NOTE — ED ADULT NURSE NOTE - NSICDXPASTMEDICALHX_GEN_ALL_CORE_FT
PAST MEDICAL HISTORY:  Bronchitis     DM (diabetes mellitus)     GERD (gastroesophageal reflux disease)

## 2023-07-13 NOTE — H&P ADULT - ASSESSMENT
Patient is a 63 year old female with a PMHx of history of hypertension (on Ramipril 10), hyperlipidemia (on Simvastatin 2.5), diabetes who presents to [place] as transfer from [place] emergency department for NSTEMI.  Patient reports that she found out her nephew had passed away on day of presentation and began to experience chest pain. Patient reports that her chest pain worsened around 7 PM prompting her arrival. Patient reports she she had an episode of emesis and continued chest pressure. Patient admits to abdominal discomfort. Patient has a significant family history of her Mother passing away from MI in her 60s and father with CAD. Patient admits to recent cough over the course of the past week for which she was started on Tessalon Perle for and a short course of steroids. Patient reports that she took 1 steroid dose. Reports cough with clear-yellow phlegm. Patient denies shortness of breath, palpitations.         Chest pain   - Concern for NSTEMI  - Trend troponin Q 8 hours  - Hep gtt  - ASA 81   - Check TTE  - Monitor on Tele   - Planned for cardiac cath per cardiology     R/O Bronchitis   - CXR with congestion / bronchiti  - Was on Prednisone 10 outpatient, received one dose  - Tessalon Perle 100 Q8 PRN   - Check Sputum culture   - Monitor O2 saturation   - Pulm Eval PRN     Abdominal discomfort, Elevated T Bili  - Trend labs closely   - ? Due to hypoperfusion from NSTEMI   -     Leukocytosis   - Likely due to reactivity versus bronchitis   - Check BCx2 and UCx   - Monitor CBC, Temp curve, VS and patient closely    Elevated Hgb   - Continue to monitor and trend; Likely due to reactivity     HTN  - On Ramipril 10 at home  - Monitor BP, VS and patient closely    HLD  - Lipid panel   - On Simvastatin at home    DM  - Hold home DM medications  - Sliding scale   - Check A1C  - Diabetic DASH diet     Recent Death   - Pt reports passing of her Nephew preceding her symptoms  - Psychiatry eval offered, patient declined     PPX  - Hep gtt    Discussed with Cardiology and Attending. Discussed with patient and her  at the bedside.         INCOMPLETE NOTE    Patient is a 63 year old female with a PMHx of history of hypertension (on Ramipril), hyperlipidemia (on Simvastatin), diabetes who presents to [place] as transfer from [place] emergency department for NSTEMI.  Patient reports that she found out her nephew had passed away on day of presentation and began to experience chest pain. Patient reports that her chest pain worsened around 7 PM prompting her arrival. Patient reports she she had an episode of emesis and continued chest pressure. Patient admits to abdominal discomfort. Patient has a significant family history of her Mother passing away from MI in her 60s and father with CAD. Patient admits to recent cough over the course of the past week for which she was started on Tessalon Perle for and a short course of steroids. Patient reports that she took 1 steroid dose. Reports cough with clear-yellow phlegm. Patient denies shortness of breath, palpitations.         Chest pain   - Concern for NSTEMI  - Trend troponin Q 8 hours  - Hep gtt  - ASA 81   - Check TTE  - Monitor on Tele   - Planned for cardiac cath per cardiology     R/O Bronchitis   - CXR with congestion / bronchiti  - Was on Prednisone 10 outpatient, received one dose  - Tessalon Perle 100 Q8 PRN   - Check Sputum culture   - Monitor O2 saturation   - Pulm Eval PRN     Abdominal discomfort, Elevated T Bili  - Trend labs closely   - ? Due to hypoperfusion from NSTEMI   -     Leukocytosis   - Likely due to reactivity versus bronchitis   - Check BCx2 and UCx   - Monitor CBC, Temp curve, VS and patient closely    Elevated Hgb   - Continue to monitor and trend; Likely due to reactivity     HTN  - On Ramipril 2.5 at home, continue with therapeutic interchange Lisinopril 10   - Monitor BP, VS and patient closely    HLD  - Lipid panel   - On Simvastatin at home    DM  - Hold home DM medications  - Sliding scale   - Check A1C  - Diabetic DASH diet     Recent Death   - Pt reports passing of her Nephew preceding her symptoms  - Psychiatry eval offered, patient declined     PPX  - Hep gtt    Discussed with Cardiology and Attending. Discussed with patient and her  at the bedside.         INCOMPLETE NOTE    Patient is a 63 year old female with a PMHx of history of hypertension (on Ramipril), hyperlipidemia (on Simvastatin), diabetes who presents to [place] as transfer from [place] emergency department for NSTEMI.  Patient reports that she found out her nephew had passed away on day of presentation and began to experience chest pain. Patient reports that her chest pain worsened around 7 PM prompting her arrival. Patient reports she she had an episode of emesis and continued chest pressure. Patient admits to abdominal discomfort. Patient has a significant family history of her Mother passing away from MI in her 60s and father with CAD. Patient admits to recent cough over the course of the past week for which she was started on Tessalon Perle for and a short course of steroids. Patient reports that she took 1 steroid dose. Reports cough with clear-yellow phlegm. Patient denies shortness of breath, palpitations.         Chest pain   - Concern for NSTEMI  - Trend troponin Q 8 hours  - Hep gtt  - ASA 81   - Check TTE  - Monitor on Tele   - Planned for cardiac cath per cardiology     R/O Bronchitis   - CXR with congestion / bronchitis  - Was on Prednisone 10 X 5 days outpatient, received one dose, will continue X 4 more days with PPI for GI PPX   - Tessalon Perle 100 Q8 PRN   - Duoneb PRN   - Check Procal in AM. If elevated, consider CT Chest and ABX at that time.   - Check Sputum culture   - Monitor O2 saturation   - Pulm Eval PRN     Abdominal discomfort, Elevated T Bili  - Trend labs closely   - ? Due to hypoperfusion from NSTEMI   - Check ABD US     Leukocytosis   - Likely due to reactivity versus bronchitis   - Check BCx2 and UCx   - Monitor off of ABX for now   - Monitor CBC, Temp curve, VS and patient closely    Acidosis   - Cont to monitor and trend     Elevated Hgb   - Continue to monitor and trend; Likely due to reactivity     HTN  - On Ramipril 2.5 at home, continue with therapeutic interchange Lisinopril 10   - Monitor BP, VS and patient closely    HLD  - Lipid panel   - On Simvastatin at home    DM  - Hold home DM medications  - Sliding scale   - Check A1C  - Diabetic DASH diet     Recent Death   - Pt reports passing of her Nephew preceding her symptoms  - Psychiatry eval offered, patient declined     PPX  - Hep gtt    Discussed with Cardiology, Attending and ACP.   Discussed with patient and her  at the bedside.         Patient is a 63 year old female with a PMHx of history of hypertension (on Ramipril), hyperlipidemia (on Simvastatin), diabetes who presents to [place] as transfer from [place] emergency department for NSTEMI.  Patient reports that she found out her nephew had passed away on day of presentation and began to experience chest pain. Patient reports that her chest pain worsened around 7 PM prompting her arrival. Patient reports she she had an episode of emesis and continued chest pressure. Patient admits to abdominal discomfort. Patient has a significant family history of her Mother passing away from MI in her 60s and father with CAD. Patient admits to recent cough over the course of the past week for which she was started on Tessalon Perle for and a short course of steroids. Patient reports that she took 1 steroid dose. Reports cough with clear-yellow phlegm. Patient denies shortness of breath, palpitations.         Chest pain   - Concern for NSTEMI  - Trend troponin Q 8 hours  - Hep gtt  - ASA 81   - Check TTE  - Monitor on Tele   - Planned for cardiac cath per cardiology     R/O Bronchitis   - CXR with congestion / bronchitis  - Was on Prednisone 10 X 5 days outpatient, received one dose, will continue X 4 more days with PPI for GI PPX   - Tessalon Perle 100 Q8 PRN   - Duoneb PRN   - Check Procal in AM. If elevated, consider CT Chest and ABX at that time.   - Check Sputum culture   - Monitor O2 saturation   - Pulm Eval PRN     Abdominal discomfort, Elevated T Bili  - Trend labs closely   - ? Due to hypoperfusion from NSTEMI   - Check ABD US     Leukocytosis   - Likely due to reactivity versus bronchitis   - Check BCx2 and UCx   - Monitor off of ABX for now   - Monitor CBC, Temp curve, VS and patient closely    Acidosis   - Cont to monitor and trend     Elevated Hgb   - Continue to monitor and trend; Likely due to reactivity     HTN  - On Ramipril 2.5 at home, continue with therapeutic interchange Lisinopril 10   - Monitor BP, VS and patient closely    HLD  - Lipid panel   - On Simvastatin at home    DM  - Hold home DM medications  - Sliding scale   - Check A1C  - Diabetic DASH diet     Recent Death   - Pt reports passing of her Nephew preceding her symptoms  - Psychiatry eval offered, patient declined     PPX  - Hep gtt    Discussed with Cardiology and ACP.   Discussed with patient and her  at the bedside.

## 2023-07-13 NOTE — ED PROVIDER NOTE - OBJECTIVE STATEMENT
63-year-old female history of hypertension, hyperlipidemia, diabetes presenting to [place] emergency department as transfer from [place] emergency department for NSTEMI.  Patient states found out nephew had  earlier today and began having chest pain that worsened around 7 PM tonight had episodes of emesis and continued chest pressure.  Also endorsing abdominal discomfort during this same timeframe.  Patient's mother passed away in her 60s from MI.  Went to [place] emergency department and was found to have elevated troponin.  Started on heparin drip and Brilinta and transferred to [facility] ED.

## 2023-07-13 NOTE — ED PROVIDER NOTE - PHYSICAL EXAMINATION
Gen: NAD  HEENT: EOMI, mucous membranes moist  CV: RRR, +S1/S2, no M/R/G, 2+ radial pulses b/l  Resp: CTAB, no W/R/R, no accessory muscle use, no increased work of breathing  GI: Abdomen soft non-distended, NTTP  MSK: No open wounds, no bruising, no LE edema  Neuro: A&Ox4, following commands, moving all four extremities spontaneously  Psych: appropriate mood

## 2023-07-13 NOTE — H&P ADULT - HISTORY OF PRESENT ILLNESS
63-year-old female history of hypertension, hyperlipidemia, diabetes presenting to [place] emergency department as transfer from [place] emergency department for NSTEMI.  Patient states found out nephew had  earlier today and began having chest pain that worsened around 7 PM tonight had episodes of emesis and continued chest pressure.  Also endorsing abdominal discomfort during this same timeframe.  Patient's mother passed away in her 60s from MI.  Went to [place] emergency department and was found to have elevated troponin.  Started on heparin drip and Brilinta and transferred to [facility] ED. Patient is a 63 year old female with a PMHx of history of hypertension (on Ramipril 10), hyperlipidemia (on Simvastatin 2.5), diabetes who presents to [place] as transfer from [place] emergency department for NSTEMI.  Patient reports that she found out her nephew had passed away on day of presentation and began to experience chest pain. Patient reports that her chest pain worsened around 7 PM prompting her arrival. Patient reports she she had an episode of emesis and continued chest pressure. Patient has a significant family history of her Mother passing away from MI in her 60s and father with CAD. Patient admits to recent cough over the course of the past week for which she was started on Tessalon Perle for and a short course of steroids. Patient reports that she took 1 steroid dose. Reports cough with clear-yellow phlegm. Patient denies shortness of breath, palpitations.      Patient is a 63 year old female with a PMHx of history of hypertension (on Ramipril 2.5), hyperlipidemia (on Simvastatin), diabetes who presents to [place] as transfer from [place] emergency department for NSTEMI.  Patient reports that she found out her nephew had passed away on day of presentation and began to experience chest pain. Patient reports that her chest pain worsened around 7 PM prompting her arrival. Patient reports she she had an episode of emesis and continued chest pressure. Patient has a significant family history of her Mother passing away from MI in her 60s and father with CAD. Patient admits to recent cough over the course of the past week for which she was started on Tessalon Perle for and a short course of steroids. Patient reports that she took 1 steroid dose. Reports cough with clear-yellow phlegm. Patient denies shortness of breath, palpitations.

## 2023-07-13 NOTE — CONSULT NOTE ADULT - SUBJECTIVE AND OBJECTIVE BOX
DATE OF SERVICE: 23 @ 09:57    CHIEF COMPLAINT:Patient is a 63y old  Female who presents with a chief complaint of Chest pain    HISTORY OF PRESENT ILLNESS: 63-year-old female history of hypertension, hyperlipidemia, diabetes presenting to [place] emergency department as transfer from [place] emergency department for NSTEMI.  Patient states found out nephew had  earlier today and began having chest pain that worsened around 7 PM tonight had episodes of emesis and continued chest pressure.  Also endorsing abdominal discomfort during this same timeframe.  Patient's mother passed away at 64 from MI. Her father also has hx of CAD/PCIs.  Found to have elevated troponin.  Denies recent shortness of breath, palpitations, edema, orthopnea or syncope.       PAST MEDICAL & SURGICAL HISTORY:  DM (diabetes mellitus)      GERD (gastroesophageal reflux disease)      Bronchitis      H/O:       MEDICATIONS:  heparin   Injectable 4700 Unit(s) IV Push every 6 hours PRN  heparin  Infusion.  Unit(s)/Hr IV Continuous <Continuous>                  FAMILY HISTORY:      Non-contributory    SOCIAL HISTORY:    [- ] Tobacco  [- ] Drugs  [- ] Alcohol    Allergies    No Known Allergies    Intolerances    	    REVIEW OF SYSTEMS:  CONSTITUTIONAL: No fever  EYES: No eye pain, visual disturbances, or discharge  ENMT:  No difficulty hearing, tinnitus  NECK: No pain or stiffness  RESPIRATORY: + cough, wheezing,  CARDIOVASCULAR: + chest pain, palpitations, passing out, dizziness, or leg swelling  GASTROINTESTINAL:  No nausea, vomiting, diarrhea or constipation. No melena.  GENITOURINARY: No dysuria, hematuria  NEUROLOGICAL: No stroke like symptoms  SKIN: No burning or lesions   ENDOCRINE: No heat or cold intolerance  MUSCULOSKELETAL: No joint pain or swelling  PSYCHIATRIC: No  anxiety, mood swings  HEME/LYMPH: No bleeding gums  ALLERGY AND IMMUNOLOGIC: No hives or eczema	    All other ROS negative    PHYSICAL EXAM:  T(C): 36.7 (23 @ 07:30), Max: 37.1 (23 @ 03:10)  HR: 98 (23 @ 07:30) (89 - 108)  BP: 104/69 (23 @ 07:30) (103/72 - 121/87)  RR: 20 (23 @ 08:00) (20 - 22)  SpO2: 95% (23 @ 08:00) (93% - 98%)  Wt(kg): --  I&O's Summary      Appearance: Normal	  HEENT:   Normal oral mucosa, EOMI	  Cardiovascular:  S1 S2, No JVD,    Respiratory: Lungs clear to auscultation	  Psychiatry: Alert  Gastrointestinal:  Soft, Non-tender, + BS	  Skin: No rashes   Neurologic: Non-focal  Extremities:  No edema  Vascular: Peripheral pulses palpable    	    	  	  CARDIAC MARKERS:  Labs personally reviewed by me                                  16.0   18.59 )-----------( 343      ( 2023 06:18 )             45.8     07-13    137  |  99  |  16  ----------------------------<  177<H>  3.8   |  15<L>  |  0.48<L>    Ca    9.4      2023 06:18  Mg     2.0     07-    TPro  6.9  /  Alb  4.4  /  TBili  3.0<H>  /  DBili  x   /  AST  40  /  ALT  41  /  AlkPhos  80            EKG: Personally reviewed by me - NSR  Radiology: Personally reviewed by me -       Assessment /Plan:      63-year-old female history of hypertension, hyperlipidemia, diabetes presenting to [place] emergency department as transfer from [place] emergency department for NSTEMI.  Patient states found out nephew had  earlier today and began having chest pain that worsened around 7 PM tonight had episodes of emesis and continued chest pressure.  Also endorsing abdominal discomfort during this same timeframe.  Patient's mother passed away at 64 from MI. Her father also has hx of CAD/PCIs.  Found to have elevated troponin.  Denies recent shortness of breath, palpitations, edema, orthopnea or syncope.     1. Chest Pain  - ECG NSR  - Trop 5134 -- 492  - D-Dimer negative  - Multiple risk factors for CAD- FHx of CAD (mother and father), HTN, HLD, DM  - c/w Heparin gtt, ASA  - Discussed R/B of cardiac cath with patient, verbalized understanding and in agreement.    2. Hypertension  - Note son ramipril for renal protection and BP typically wnl  - can resume ramipril as BP tolerates    3. HLD  - Check lipid panel  - c/w simvastatin 20mg PO daily    4. DM II  - Check HgbA1c  - ISS as per primary team    Differential diagnosis and plan of care discussed with patient after the evaluation. Counseling on diet, nutritional counseling, weight management, exercise and medication compliance was done.   Advanced care planning/advanced directives discussed with patient/family. DNR status including forceful chest compressions to attempt to restart the heart, ventilator support/artificial breathing, electric shock, artificial nutrition, health care proxy, Molst form all discussed with pt. Pt wishes to consider. More than fifteen minutes spent on discussing advanced directives.     Geri Andrews DO [facility]  Cardiovascular Medicine  [address] [place] [address] Dr, [address]  Available for call or text via [employer]  Office [phone]   DATE OF SERVICE: 23 @ 09:57    CHIEF COMPLAINT:Patient is a 63y old  Female who presents with a chief complaint of Chest pain    HISTORY OF PRESENT ILLNESS: 63-year-old female history of hypertension, hyperlipidemia, diabetes presenting to [place] emergency department as transfer from [place] emergency department for NSTEMI.  Patient states found out nephew had  earlier today and began having chest pain that worsened around 7 PM tonight had episodes of emesis and continued chest pressure.  Also endorsing abdominal discomfort during this same timeframe.  Patient's mother passed away at 64 from MI. Her father also has hx of CAD/PCIs.  Found to have elevated troponin.  Denies recent shortness of breath, palpitations, edema, orthopnea or syncope.       PAST MEDICAL & SURGICAL HISTORY:  DM (diabetes mellitus)      GERD (gastroesophageal reflux disease)      Bronchitis      H/O:       MEDICATIONS:  heparin   Injectable 4700 Unit(s) IV Push every 6 hours PRN  heparin  Infusion.  Unit(s)/Hr IV Continuous <Continuous>                  FAMILY HISTORY:      Non-contributory    SOCIAL HISTORY:    [- ] Tobacco  [- ] Drugs  [- ] Alcohol    Allergies    No Known Allergies    Intolerances    	    REVIEW OF SYSTEMS:  CONSTITUTIONAL: No fever  EYES: No eye pain, visual disturbances, or discharge  ENMT:  No difficulty hearing, tinnitus  NECK: No pain or stiffness  RESPIRATORY: + cough, wheezing,  CARDIOVASCULAR: + chest pain, palpitations, passing out, dizziness, or leg swelling  GASTROINTESTINAL:  No nausea, vomiting, diarrhea or constipation. No melena.  GENITOURINARY: No dysuria, hematuria  NEUROLOGICAL: No stroke like symptoms  SKIN: No burning or lesions   ENDOCRINE: No heat or cold intolerance  MUSCULOSKELETAL: No joint pain or swelling  PSYCHIATRIC: No  anxiety, mood swings  HEME/LYMPH: No bleeding gums  ALLERGY AND IMMUNOLOGIC: No hives or eczema	    All other ROS negative    PHYSICAL EXAM:  T(C): 36.7 (23 @ 07:30), Max: 37.1 (23 @ 03:10)  HR: 98 (23 @ 07:30) (89 - 108)  BP: 104/69 (23 @ 07:30) (103/72 - 121/87)  RR: 20 (23 @ 08:00) (20 - 22)  SpO2: 95% (23 @ 08:00) (93% - 98%)  Wt(kg): --  I&O's Summary      Appearance: Normal	  HEENT:   Normal oral mucosa, EOMI	  Cardiovascular:  S1 S2, No JVD,    Respiratory: Lungs clear to auscultation	  Psychiatry: Alert  Gastrointestinal:  Soft, Non-tender, + BS	  Skin: No rashes   Neurologic: Non-focal  Extremities:  No edema  Vascular: Peripheral pulses palpable    	    	  	  CARDIAC MARKERS:  Labs personally reviewed by me                                  16.0   18.59 )-----------( 343      ( 2023 06:18 )             45.8     07-13    137  |  99  |  16  ----------------------------<  177<H>  3.8   |  15<L>  |  0.48<L>    Ca    9.4      2023 06:18  Mg     2.0     -    TPro  6.9  /  Alb  4.4  /  TBili  3.0<H>  /  DBili  x   /  AST  40  /  ALT  41  /  AlkPhos  80            EKG: Personally reviewed by me - NSR  Radiology: Personally reviewed by me -       Assessment /Plan:      63-year-old female history of hypertension, hyperlipidemia, diabetes presenting to [place] emergency department as transfer from [place] emergency department for NSTEMI.  Patient states found out nephew had  earlier today and began having chest pain that worsened around 7 PM tonight had episodes of emesis and continued chest pressure.  Also endorsing abdominal discomfort during this same timeframe.  Patient's mother passed away at 64 from MI. Her father also has hx of CAD/PCIs.  Found to have elevated troponin.  Denies recent shortness of breath, palpitations, edema, orthopnea or syncope.     1. Chest Pain  - ECG NSR  - Trop 5134 -- 492  - D-Dimer negative  - Multiple risk factors for CAD- FHx of CAD (mother and father), HTN, HLD, DM  - c/w Heparin gtt, ASA  - Discussed R/B of cardiac cath with patient, verbalized understanding and in agreement.  - Echo    2. Hypertension  - Note son ramipril for renal protection and BP typically wnl  - can resume ramipril as BP tolerates    3. HLD  - Check lipid panel  - c/w simvastatin 20mg PO daily    4. DM II  - Check HgbA1c  - ISS as per primary team        Differential diagnosis and plan of care discussed with patient after the evaluation. Counseling on diet, nutritional counseling, weight management, exercise and medication compliance was done.   Advanced care planning/advanced directives discussed with patient/family. DNR status including forceful chest compressions to attempt to restart the heart, ventilator support/artificial breathing, electric shock, artificial nutrition, health care proxy, Molst form all discussed with pt. Pt wishes to consider. More than fifteen minutes spent on discussing advanced directives.     Geri Andrews DO [facility]  Cardiovascular Medicine  [address] [place] [address] Dr, [address]  Available for call or text via [employer]  Office [phone]

## 2023-07-13 NOTE — ED PROVIDER NOTE - ATTENDING CONTRIBUTION TO CARE
I, Dr. Neris Nesbitt, have personally performed a face to face medical and diagnostic evaluation of the patient. I have discussed with and reviewed the Resident's and/or ACP's and/or Medical/PA/NP student's note and agree with the History, ROS, Physical Exam and MDM unless otherwise indicated. A brief summary of my personal evaluation and impression can be found below.    Agree with above.    MDM: Patient is a 63-year-old female with history of hypertension, hyperlipidemia, diabetes, no previous cardiac history presenting as transfer for NSTEMI.  Symptom onset yesterday morning when she found out her nephew had .  Had dyspepsia, left lower chest pressure with multiple episodes of emesis.  Patient's EKG is nonischemic, unchanged from outside hospital.  Troponin I elevated into 5000's.  Patient given full dose aspirin, Brilinta 180, heparin.  For symptoms she was also given fentanyl 50 mcg x 1, Pepcid 20 mg x 1 and Zofran 4 mg x 1.  Cardiology consulted on arrival.  Concern is primarily for ACS. Possible concomitant infectious etiology given WBC count and chills. Doubt intra-abdominal pathology. We will repeat labs now, continue heparin drip, give additional dose of Zofran and admit.

## 2023-07-13 NOTE — ED ADULT NURSE REASSESSMENT NOTE - NS ED NURSE REASSESS COMMENT FT1
Pts aPTT 34.2 seconds pt given additional heparin bolus based on lab results. Heparin Infusion running at 9 ml/hr as per order. repeat blood work to be done at 1230. Pt admitted and waiting for bed  Pt reweighed and accurate weight documented in chart 75 kg

## 2023-07-13 NOTE — ED ADULT NURSE NOTE - OBJECTIVE STATEMENT
63 y.o. female coming in from [place] for NSTEMI. pt states that she found her nephew dead and started to experience chest pain. pt states that the chest pain was associated with SOB and nausea, chest pain was noted to be in the left side of the chest with radiation to the middle of the chest. pt was then seen at [facility] for chest pain workup, was found to have elevated troponin levels and was transferred to [facility] for farther cardiac evaluation. pt was started on heparin at 00:09 at 9 mL/hr with a 4,400 unit bolus, pt was also given 180 mg of Brilinta, 162 mg of ASA, 20mg of Pepcid, and 4 mg of Zofran, and 50 mcg of fentanyl. pt was then transferred to [facility]. pt continued on heparin drip at 9 mL/hr, vss, NSR on tele, A&Ox3. PMH diabetes, bronchitis, GERD.

## 2023-07-13 NOTE — PATIENT PROFILE ADULT - FALL HARM RISK - HARM RISK INTERVENTIONS

## 2023-07-13 NOTE — ED PROVIDER NOTE - CLINICAL SUMMARY MEDICAL DECISION MAKING FREE TEXT BOX
63-year-old female NSTEMI transfer from [place].  Chest pain that started at 7 PM with associated abdominal discomfort, nausea and vomiting.  Found to have elevated troponin.  EKG normal sinus in emergency department.  Discussed with cardiology who will come evaluate patient.  Patient currently on heparin drip, received Brilinta at [place].  Patient anxious appearing, given 1 mg Ativan.  Zofran for nausea.  Will repeat labs.  Will reassess and discuss with cardiology.

## 2023-07-13 NOTE — H&P ADULT - NSHPPHYSICALEXAM_GEN_ALL_CORE
Vital Signs Last 24 Hrs  T(C): 36.8 (13 Jul 2023 11:00), Max: 37.1 (13 Jul 2023 03:10)  T(F): 98.3 (13 Jul 2023 11:00), Max: 98.8 (13 Jul 2023 03:10)  HR: 84 (13 Jul 2023 11:00) (84 - 108)  BP: 101/69 (13 Jul 2023 11:00) (101/69 - 121/87)  BP(mean): 80 (13 Jul 2023 07:30) (80 - 80)  RR: 18 (13 Jul 2023 11:00) (18 - 22)  SpO2: 94% (13 Jul 2023 11:00) (93% - 98%)    Parameters below as of 13 Jul 2023 11:00  Patient On (Oxygen Delivery Method): room air        Appearance: Awake, Sitting up in bed   HEENT:   Normal oral mucosa, Eyes are open; Glasses   Lymphatic: No lymphadenopathy , no edema  Cardiovascular: Normal S1 S2, No JVD, No murmurs   Respiratory: + Mild forced expiratory wheeze   Gastrointestinal:  Soft, + Abdominal discomfort 	  Skin: No rashes, No ecchymoses, No cyanosis, warm to touch  Musculoskeletal: Normal range of motion, normal strength  Psychiatry: Flat affect   Ext: No edema

## 2023-07-13 NOTE — H&P ADULT - NSHPREVIEWOFSYSTEMS_GEN_ALL_CORE
REVIEW OF SYSTEMS:    CONSTITUTIONAL: + Chest pain   EYES/ENT: No visual changes;  No vertigo or throat pain   NECK: No pain or stiffness  RESPIRATORY: + Productive Cough of clear- yellow phlegm; No hemoptysis; No shortness of breath  CARDIOVASCULAR + Chest pain   GASTROINTESTINAL: + Abdominal pain; + Emesis; No diarrhea or constipation. No melena or hematochezia.  GENITOURINARY: No dysuria, frequency or hematuria  NEUROLOGICAL: No numbness or weakness  SKIN: No itching, burning, rashes, or lesions   All other review of systems is negative unless indicated above.

## 2023-07-14 LAB
A1C WITH ESTIMATED AVERAGE GLUCOSE RESULT: 6.6 % — HIGH (ref 4–5.6)
ALBUMIN SERPL ELPH-MCNC: 4.5 G/DL — SIGNIFICANT CHANGE UP (ref 3.3–5)
ALP SERPL-CCNC: 79 U/L — SIGNIFICANT CHANGE UP (ref 40–120)
ALT FLD-CCNC: 33 U/L — SIGNIFICANT CHANGE UP (ref 10–45)
ANION GAP SERPL CALC-SCNC: 24 MMOL/L — HIGH (ref 5–17)
APTT BLD: 23.8 SEC — LOW (ref 27.5–35.5)
AST SERPL-CCNC: 34 U/L — SIGNIFICANT CHANGE UP (ref 10–40)
BILIRUB SERPL-MCNC: 2.3 MG/DL — HIGH (ref 0.2–1.2)
BUN SERPL-MCNC: 15 MG/DL — SIGNIFICANT CHANGE UP (ref 7–23)
CALCIUM SERPL-MCNC: 9.6 MG/DL — SIGNIFICANT CHANGE UP (ref 8.4–10.5)
CHLORIDE SERPL-SCNC: 99 MMOL/L — SIGNIFICANT CHANGE UP (ref 96–108)
CHOLEST SERPL-MCNC: 116 MG/DL — SIGNIFICANT CHANGE UP
CO2 SERPL-SCNC: 14 MMOL/L — LOW (ref 22–31)
CREAT SERPL-MCNC: 0.55 MG/DL — SIGNIFICANT CHANGE UP (ref 0.5–1.3)
EGFR: 102 ML/MIN/1.73M2 — SIGNIFICANT CHANGE UP
ESTIMATED AVERAGE GLUCOSE: 143 MG/DL — HIGH (ref 68–114)
GLUCOSE BLDC GLUCOMTR-MCNC: 118 MG/DL — HIGH (ref 70–99)
GLUCOSE BLDC GLUCOMTR-MCNC: 119 MG/DL — HIGH (ref 70–99)
GLUCOSE BLDC GLUCOMTR-MCNC: 144 MG/DL — HIGH (ref 70–99)
GLUCOSE BLDC GLUCOMTR-MCNC: 171 MG/DL — HIGH (ref 70–99)
GLUCOSE SERPL-MCNC: 155 MG/DL — HIGH (ref 70–99)
GRAM STN FLD: SIGNIFICANT CHANGE UP
HCT VFR BLD CALC: 47.9 % — HIGH (ref 34.5–45)
HCV AB S/CO SERPL IA: 0.09 S/CO — SIGNIFICANT CHANGE UP (ref 0–0.99)
HCV AB SERPL-IMP: SIGNIFICANT CHANGE UP
HDLC SERPL-MCNC: 58 MG/DL — SIGNIFICANT CHANGE UP
HGB BLD-MCNC: 16.5 G/DL — HIGH (ref 11.5–15.5)
LIPID PNL WITH DIRECT LDL SERPL: 39 MG/DL — SIGNIFICANT CHANGE UP
MCHC RBC-ENTMCNC: 32.5 PG — SIGNIFICANT CHANGE UP (ref 27–34)
MCHC RBC-ENTMCNC: 34.4 GM/DL — SIGNIFICANT CHANGE UP (ref 32–36)
MCV RBC AUTO: 94.3 FL — SIGNIFICANT CHANGE UP (ref 80–100)
NON HDL CHOLESTEROL: 58 MG/DL — SIGNIFICANT CHANGE UP
NRBC # BLD: 0 /100 WBCS — SIGNIFICANT CHANGE UP (ref 0–0)
PLATELET # BLD AUTO: 310 K/UL — SIGNIFICANT CHANGE UP (ref 150–400)
POTASSIUM SERPL-MCNC: 3.7 MMOL/L — SIGNIFICANT CHANGE UP (ref 3.5–5.3)
POTASSIUM SERPL-SCNC: 3.7 MMOL/L — SIGNIFICANT CHANGE UP (ref 3.5–5.3)
PROCALCITONIN SERPL-MCNC: 0.27 NG/ML — HIGH (ref 0.02–0.1)
PROT SERPL-MCNC: 7.4 G/DL — SIGNIFICANT CHANGE UP (ref 6–8.3)
RBC # BLD: 5.08 M/UL — SIGNIFICANT CHANGE UP (ref 3.8–5.2)
RBC # FLD: 12.4 % — SIGNIFICANT CHANGE UP (ref 10.3–14.5)
SODIUM SERPL-SCNC: 137 MMOL/L — SIGNIFICANT CHANGE UP (ref 135–145)
SPECIMEN SOURCE: SIGNIFICANT CHANGE UP
TRIGL SERPL-MCNC: 100 MG/DL — SIGNIFICANT CHANGE UP
TROPONIN T, HIGH SENSITIVITY RESULT: 373 NG/L — HIGH (ref 0–51)
TSH SERPL-MCNC: 0.84 UIU/ML — SIGNIFICANT CHANGE UP (ref 0.27–4.2)
WBC # BLD: 17.17 K/UL — HIGH (ref 3.8–10.5)
WBC # FLD AUTO: 17.17 K/UL — HIGH (ref 3.8–10.5)

## 2023-07-14 RX ORDER — DAPAGLIFLOZIN 10 MG/1
10 TABLET, FILM COATED ORAL EVERY 24 HOURS
Refills: 0 | Status: DISCONTINUED | OUTPATIENT
Start: 2023-07-14 | End: 2023-07-17

## 2023-07-14 RX ORDER — METOPROLOL TARTRATE 50 MG
25 TABLET ORAL DAILY
Refills: 0 | Status: DISCONTINUED | OUTPATIENT
Start: 2023-07-14 | End: 2023-07-17

## 2023-07-14 RX ORDER — POTASSIUM CHLORIDE 20 MEQ
40 PACKET (EA) ORAL ONCE
Refills: 0 | Status: COMPLETED | OUTPATIENT
Start: 2023-07-14 | End: 2023-07-14

## 2023-07-14 RX ADMIN — DAPAGLIFLOZIN 10 MILLIGRAM(S): 10 TABLET, FILM COATED ORAL at 13:19

## 2023-07-14 RX ADMIN — VALSARTAN 20 MILLIGRAM(S): 80 TABLET ORAL at 21:25

## 2023-07-14 RX ADMIN — Medication 40 MILLIEQUIVALENT(S): at 05:18

## 2023-07-14 RX ADMIN — PANTOPRAZOLE SODIUM 40 MILLIGRAM(S): 20 TABLET, DELAYED RELEASE ORAL at 05:18

## 2023-07-14 RX ADMIN — Medication 100 MILLIGRAM(S): at 12:12

## 2023-07-14 RX ADMIN — Medication 40 MILLIGRAM(S): at 10:16

## 2023-07-14 RX ADMIN — Medication 1: at 12:16

## 2023-07-14 RX ADMIN — Medication 81 MILLIGRAM(S): at 05:18

## 2023-07-14 RX ADMIN — VALSARTAN 20 MILLIGRAM(S): 80 TABLET ORAL at 10:17

## 2023-07-14 RX ADMIN — Medication 30 MILLILITER(S): at 15:26

## 2023-07-14 NOTE — PROGRESS NOTE ADULT - ASSESSMENT
Patient is a 63 year old female with a PMHx of history of hypertension (on Ramipril), hyperlipidemia (on Simvastatin), diabetes who presents to [place] as transfer from [place] emergency department for NSTEMI. Consult called for co medical mgmt.    # Chest pain r/o ACS:  - Concern for NSTEMI  - Trend troponin Q 8 hours  - Hep gtt  - ASA 81   - Check TTE  - Monitor on Tele   - s/p cardiac cath 7/13 with no significant angiographic evidence of CAD. Ventriculogram consistent with Takatsubo CM. Recommended for medical mgmt.     # SOB:  - Started on IV Lasix  - Monitor O2    # R/O Bronchitis:   - CXR with congestion / bronchitis  - s/p Prednisone 10 x 5 days outpatient  - Tessalon Perle 100 Q8 PRN   - Duoneb PRN   - Follow up Sputum Cx  - Monitor O2 saturation     # Abdominal discomfort, Elevated T Bili:  - Abd US with Hepatomegaly and hepatic steatosis. No sonographic evidence of cholelithiasis, acute cholecystitis or dilated bile ducts.    # Leukocytosis:   - Likely due to reactivity versus bronchitis   - Follow up BCx2 and UCx   - Monitor off of ABX for now   - Monitor temps/WBC    # HLD/HTN:  - C/w CV meds per Cards    # DM:  - Hold home DM medications  - Sliding scale   - A1C 6.6%  - Diabetic DASH diet     # Recent Death:   - Pt reports passing of her Nephew preceding her symptoms  - Psychiatry eval offered, patient declined     # GI ppx:  - Protonix    # DVT ppx:  - Heparin gtt Patient is a 63 year old female with a PMHx of history of hypertension (on Ramipril), hyperlipidemia (on Simvastatin), diabetes who presents to [place] as transfer from [place] emergency department for NSTEMI. Consult called for co medical mgmt.    # Chest pain r/o ACS:  - Concern for NSTEMI  - Trend troponin Q 8 hours  - Hep gtt  - ASA 81   - Check TTE  - Monitor on Tele   - s/p cardiac cath 7/13 with no significant angiographic evidence of CAD. Ventriculogram consistent with Takatsubo CM. Recommended for medical mgmt.   - Care per cards    # SOB:  - Started on IV Lasix  - Monitor O2    # R/O Bronchitis:   - CXR with congestion / bronchitis  - s/p Prednisone 10 x 5 days outpatient  - Tessalon Perle 100 Q8 PRN   - Duoneb PRN   - Follow up Sputum Cx  - Monitor O2 saturation     # Abdominal discomfort, Elevated T Bili:  - Abd US with Hepatomegaly and hepatic steatosis. No sonographic evidence of cholelithiasis, acute cholecystitis or dilated bile ducts.    # Leukocytosis:   - Likely due to reactivity versus bronchitis   - Follow up BCx2 and UCx   - Monitor off of ABX for now   - Monitor temps/WBC    # HLD/HTN:  - C/w CV meds per Cards    # DM:  - Hold home DM medications  - Sliding scale   - A1C 6.6%  - Diabetic DASH diet     # Recent Death:   - Pt reports passing of her Nephew preceding her symptoms  - Psychiatry eval offered, patient declined     # GI ppx:  - Protonix    # DVT ppx:  - Heparin gtt

## 2023-07-14 NOTE — PROGRESS NOTE ADULT - SUBJECTIVE AND OBJECTIVE BOX
SUBJECTIVE / OVERNIGHT EVENTS:      INCOMPLETE NOTE     --------------------------------------------------------------------------------------------  LABS:                        16.5   17.17 )-----------( 310      ( 14 Jul 2023 01:41 )             47.9     07-14    137  |  99  |  15  ----------------------------<  155<H>  3.7   |  14<L>  |  0.55    Ca    9.6      14 Jul 2023 01:41  Mg     2.0     07-12    TPro  7.4  /  Alb  4.5  /  TBili  2.3<H>  /  DBili  x   /  AST  34  /  ALT  33  /  AlkPhos  79  07-14    PT/INR - ( 13 Jul 2023 06:18 )   PT: 12.6 sec;   INR: 1.09 ratio         PTT - ( 14 Jul 2023 01:41 )  PTT:23.8 sec  CAPILLARY BLOOD GLUCOSE      POCT Blood Glucose.: 171 mg/dL (14 Jul 2023 11:35)  POCT Blood Glucose.: 144 mg/dL (14 Jul 2023 07:17)  POCT Blood Glucose.: 155 mg/dL (13 Jul 2023 21:14)  POCT Blood Glucose.: 148 mg/dL (13 Jul 2023 14:27)        Urinalysis Basic - ( 14 Jul 2023 01:41 )    Color: x / Appearance: x / SG: x / pH: x  Gluc: 155 mg/dL / Ketone: x  / Bili: x / Urobili: x   Blood: x / Protein: x / Nitrite: x   Leuk Esterase: x / RBC: x / WBC x   Sq Epi: x / Non Sq Epi: x / Bacteria: x        RADIOLOGY & ADDITIONAL TESTS:    Imaging Personally Reviewed:  [x] YES  [ ] NO    Consultant(s) Notes Reviewed:  [x] YES  [ ] NO    MEDICATIONS  (STANDING):  dapagliflozin 10 milliGRAM(s) Oral every 24 hours  dextrose 5%. 1000 milliLiter(s) (100 mL/Hr) IV Continuous <Continuous>  dextrose 5%. 1000 milliLiter(s) (50 mL/Hr) IV Continuous <Continuous>  dextrose 50% Injectable 25 Gram(s) IV Push once  dextrose 50% Injectable 25 Gram(s) IV Push once  dextrose 50% Injectable 12.5 Gram(s) IV Push once  furosemide   Injectable 40 milliGRAM(s) IV Push daily  glucagon  Injectable 1 milliGRAM(s) IntraMuscular once  insulin lispro (ADMELOG) corrective regimen sliding scale   SubCutaneous three times a day before meals  insulin lispro (ADMELOG) corrective regimen sliding scale   SubCutaneous at bedtime  metoprolol succinate ER 25 milliGRAM(s) Oral daily  pantoprazole    Tablet 40 milliGRAM(s) Oral before breakfast  sodium chloride 0.9%. 1000 milliLiter(s) (75 mL/Hr) IV Continuous <Continuous>  valsartan 20 milliGRAM(s) Oral two times a day    MEDICATIONS  (PRN):  albuterol/ipratropium for Nebulization 3 milliLiter(s) Nebulizer every 6 hours PRN Shortness of Breath and/or Wheezing  benzonatate 100 milliGRAM(s) Oral every 8 hours PRN Cough  dextrose Oral Gel 15 Gram(s) Oral once PRN Blood Glucose LESS THAN 70 milliGRAM(s)/deciliter      Care Discussed with Consultants/Other Providers [x] YES  [ ] NO    Vital Signs Last 24 Hrs  T(C): 36.7 (14 Jul 2023 12:21), Max: 36.8 (13 Jul 2023 14:20)  T(F): 98.1 (14 Jul 2023 12:21), Max: 98.2 (13 Jul 2023 14:20)  HR: 92 (14 Jul 2023 12:21) (87 - 115)  BP: 95/51 (14 Jul 2023 12:21) (94/50 - 111/60)  BP(mean): 61 (14 Jul 2023 12:21) (60 - 82)  RR: 17 (14 Jul 2023 12:21) (16 - 17)  SpO2: 95% (14 Jul 2023 12:21) (89% - 98%)    Parameters below as of 14 Jul 2023 12:21  Patient On (Oxygen Delivery Method): room air      I&O's Summary    14 Jul 2023 07:01 - 14 Jul 2023 13:36  --------------------------------------------------------  IN: 480 mL / OUT: 0 mL / NET: 480 mL           SUBJECTIVE / OVERNIGHT EVENTS:    patient seen and examined  resting comfortably in bed  s/p cath yesterday  BP soft  --------------------------------------------------------------------------------------------  LABS:                        16.5   17.17 )-----------( 310      ( 14 Jul 2023 01:41 )             47.9     07-14    137  |  99  |  15  ----------------------------<  155<H>  3.7   |  14<L>  |  0.55    Ca    9.6      14 Jul 2023 01:41  Mg     2.0     07-12    TPro  7.4  /  Alb  4.5  /  TBili  2.3<H>  /  DBili  x   /  AST  34  /  ALT  33  /  AlkPhos  79  07-14    PT/INR - ( 13 Jul 2023 06:18 )   PT: 12.6 sec;   INR: 1.09 ratio         PTT - ( 14 Jul 2023 01:41 )  PTT:23.8 sec  CAPILLARY BLOOD GLUCOSE      POCT Blood Glucose.: 171 mg/dL (14 Jul 2023 11:35)  POCT Blood Glucose.: 144 mg/dL (14 Jul 2023 07:17)  POCT Blood Glucose.: 155 mg/dL (13 Jul 2023 21:14)  POCT Blood Glucose.: 148 mg/dL (13 Jul 2023 14:27)        Urinalysis Basic - ( 14 Jul 2023 01:41 )    Color: x / Appearance: x / SG: x / pH: x  Gluc: 155 mg/dL / Ketone: x  / Bili: x / Urobili: x   Blood: x / Protein: x / Nitrite: x   Leuk Esterase: x / RBC: x / WBC x   Sq Epi: x / Non Sq Epi: x / Bacteria: x        RADIOLOGY & ADDITIONAL TESTS:    Imaging Personally Reviewed:  [x] YES  [ ] NO    Consultant(s) Notes Reviewed:  [x] YES  [ ] NO    MEDICATIONS  (STANDING):  dapagliflozin 10 milliGRAM(s) Oral every 24 hours  dextrose 5%. 1000 milliLiter(s) (100 mL/Hr) IV Continuous <Continuous>  dextrose 5%. 1000 milliLiter(s) (50 mL/Hr) IV Continuous <Continuous>  dextrose 50% Injectable 25 Gram(s) IV Push once  dextrose 50% Injectable 25 Gram(s) IV Push once  dextrose 50% Injectable 12.5 Gram(s) IV Push once  furosemide   Injectable 40 milliGRAM(s) IV Push daily  glucagon  Injectable 1 milliGRAM(s) IntraMuscular once  insulin lispro (ADMELOG) corrective regimen sliding scale   SubCutaneous three times a day before meals  insulin lispro (ADMELOG) corrective regimen sliding scale   SubCutaneous at bedtime  metoprolol succinate ER 25 milliGRAM(s) Oral daily  pantoprazole    Tablet 40 milliGRAM(s) Oral before breakfast  sodium chloride 0.9%. 1000 milliLiter(s) (75 mL/Hr) IV Continuous <Continuous>  valsartan 20 milliGRAM(s) Oral two times a day    MEDICATIONS  (PRN):  albuterol/ipratropium for Nebulization 3 milliLiter(s) Nebulizer every 6 hours PRN Shortness of Breath and/or Wheezing  benzonatate 100 milliGRAM(s) Oral every 8 hours PRN Cough  dextrose Oral Gel 15 Gram(s) Oral once PRN Blood Glucose LESS THAN 70 milliGRAM(s)/deciliter      Care Discussed with Consultants/Other Providers [x] YES  [ ] NO    Vital Signs Last 24 Hrs  T(C): 36.7 (14 Jul 2023 12:21), Max: 36.8 (13 Jul 2023 14:20)  T(F): 98.1 (14 Jul 2023 12:21), Max: 98.2 (13 Jul 2023 14:20)  HR: 92 (14 Jul 2023 12:21) (87 - 115)  BP: 95/51 (14 Jul 2023 12:21) (94/50 - 111/60)  BP(mean): 61 (14 Jul 2023 12:21) (60 - 82)  RR: 17 (14 Jul 2023 12:21) (16 - 17)  SpO2: 95% (14 Jul 2023 12:21) (89% - 98%)    Parameters below as of 14 Jul 2023 12:21  Patient On (Oxygen Delivery Method): room air      I&O's Summary    14 Jul 2023 07:01  -  14 Jul 2023 13:36  --------------------------------------------------------  IN: 480 mL / OUT: 0 mL / NET: 480 mL      PHYSICAL EXAM:  GENERAL: NAD, well-developed, comfortable  HEAD:  Atraumatic, Normocephalic  EYES: EOMI, PERRLA, conjunctiva and sclera clear  NECK: Supple, No JVD  CHEST/LUNG: Clear to auscultation bilaterally; No wheeze  HEART: Regular rate and rhythm; No murmurs, rubs, or gallops  ABDOMEN: Soft, Nontender, Nondistended; Bowel sounds present  NEURO: AAOx3, no focal weakness, 5/5 b/l extremity strength, b/l knee no arthritis, no effusion   EXTREMITIES:  2+ Peripheral Pulses, No clubbing, cyanosis, or edema, right radial dag c/d/i, + pulses  SKIN: No rashes or lesions

## 2023-07-14 NOTE — PROGRESS NOTE ADULT - NSPROGADDITIONALINFOA_GEN_ALL_CORE
I had a prolonged conversation with the patient/family regarding hospital course, differential diagnosis and results of diagnostic tests.  Plan of care discussed with patient/family after the evaluation. Patient/family express clear understanding and satisfaction with the plan of care.  Sixty five minutes spent on encounter, of which more than fifty percent of the encounter was spent on counseling and/or coordinating care by the attending physician.

## 2023-07-14 NOTE — PROGRESS NOTE ADULT - SUBJECTIVE AND OBJECTIVE BOX
DATE OF SERVICE: 23 @ 22:17    Patient is a 63y old  Female who presents with a chief complaint of Chest pain (2023 13:35)      INTERVAL HISTORY: feels ok    REVIEW OF SYSTEMS:  CONSTITUTIONAL: No weakness  EYES/ENT: No visual changes;  No throat pain   NECK: No pain or stiffness  RESPIRATORY: No cough, wheezing; No shortness of breath  CARDIOVASCULAR: No chest pain or palpitations  GASTROINTESTINAL: No abdominal  pain. No nausea, vomiting, or hematemesis  GENITOURINARY: No dysuria, frequency or hematuria  NEUROLOGICAL: No stroke like symptoms  SKIN: No rashes      TELEMETRY Personally reviewed: NSR, no events  	  MEDICATIONS:  furosemide   Injectable 40 milliGRAM(s) IV Push daily  metoprolol succinate ER 25 milliGRAM(s) Oral daily  valsartan 20 milliGRAM(s) Oral two times a day        PHYSICAL EXAM:  T(C): 36.7 (23 @ 20:15), Max: 36.9 (23 @ 16:25)  HR: 92 (23 @ 20:15) (87 - 104)  BP: 106/54 (23 @ 20:15) (95/51 - 111/60)  RR: 17 (23 @ 20:15) (16 - 17)  SpO2: 96% (23 @ 20:15) (94% - 98%)  Wt(kg): --  I&O's Summary    2023 07:01  -  2023 22:17  --------------------------------------------------------  IN: 480 mL / OUT: 0 mL / NET: 480 mL          Appearance: In no distress	  HEENT:    PERRL, EOMI	  Cardiovascular:  S1 S2, No JVD  Respiratory: Lungs clear to auscultation	  Gastrointestinal:  Soft, Non-tender, + BS	  Vascularature:  No edema of LE  Psychiatric: Appropriate affect   Neuro: no acute focal deficits                               16.5   17.17 )-----------( 310      ( 2023 01:41 )             47.9         137  |  99  |  15  ----------------------------<  155<H>  3.7   |  14<L>  |  0.55    Ca    9.6      2023 01:41  Mg     2.0     07-12    TPro  7.4  /  Alb  4.5  /  TBili  2.3<H>  /  DBili  x   /  AST  34  /  ALT  33  /  AlkPhos  79  07-14        Labs personally reviewed        < from: TTE W or WO Ultrasound Enhancing Agent (23 @ 13:06) >  CONCLUSIONS:      1. Normal left ventricular cavity size. The left ventricular systolic function is severely decreased with an ejection fraction of 26 % by Lewis's method of disks. There are regional wall motion abnormalities No evidence of a thrombus in the left ventricle.   2. Multiple segmental abnormalities exist. See findings.   3. Normal right ventricular cavity size and normal right ventricular systolic function.   4. The left atrium is mildly dilated.   5. The right atrium is normal.   6. No pericardial effusion seen.   7. There is moderate tricuspid regurgitation. Estimated pulmonary artery systolic pressure is 60 mmHg.   8. No prior echocardiogram is available for comparison.    < end of copied text >      Assessment /Plan:      63-year-old female history of hypertension, hyperlipidemia, diabetes presenting to [place] emergency department as transfer from [place] emergency department for NSTEMI.  Patient states found out nephew had  earlier today and began having chest pain that worsened around 7 PM tonight had episodes of emesis and continued chest pressure.  Also endorsing abdominal discomfort during this same timeframe.  Patient's mother passed away at 64 from MI. Her father also has hx of CAD/PCIs.  Found to have elevated troponin.  Denies recent shortness of breath, palpitations, edema, orthopnea or syncope.     1. Chest Pain  - ECG NSR  - Trop 5134 -- 492  - D-Dimer negative  - Multiple risk factors for CAD- FHx of CAD (mother and father), HTN, HLD, DM  - d/c Heparin gtt, ASA, statin as cath with patent cors  - Likely stress induced CM with EF of 26%    2. Hypertension  - Note son ramipril for renal protection and BP typically wnl  - can resume ramipril as BP tolerates    3. HLD  - Check lipid panel  - c/w simvastatin 20mg PO daily    4. Acute systolic HF  - Stress induced CM with EF of 26%  - GDMT - stared Toprol 25mg, Farxiga 10mg, Valsartan 20mg BID   - d/c IV Lasix  - transition to Lasix 40mg PO daily    5. Acidosis - Bicarb trended from 15-->14 today  - rec ABG   - check lactate   - discussed with primary team Dr Lawson, ?DAHIANA Andrews DO [facility]  Cardiovascular Medicine  [address] [place] [address], [address]  Office: [phone]  Available via Text/call on Microsoft Teams

## 2023-07-15 LAB
ANION GAP SERPL CALC-SCNC: 17 MMOL/L — SIGNIFICANT CHANGE UP (ref 5–17)
BASE EXCESS BLDA CALC-SCNC: -3.8 MMOL/L — LOW (ref -2–3)
BUN SERPL-MCNC: 26 MG/DL — HIGH (ref 7–23)
CALCIUM SERPL-MCNC: 9.3 MG/DL — SIGNIFICANT CHANGE UP (ref 8.4–10.5)
CHLORIDE SERPL-SCNC: 101 MMOL/L — SIGNIFICANT CHANGE UP (ref 96–108)
CO2 BLDA-SCNC: 19 MMOL/L — SIGNIFICANT CHANGE UP (ref 19–24)
CO2 SERPL-SCNC: 19 MMOL/L — LOW (ref 22–31)
CREAT SERPL-MCNC: 0.54 MG/DL — SIGNIFICANT CHANGE UP (ref 0.5–1.3)
CULTURE RESULTS: SIGNIFICANT CHANGE UP
EGFR: 103 ML/MIN/1.73M2 — SIGNIFICANT CHANGE UP
GAS PNL BLDA: SIGNIFICANT CHANGE UP
GLUCOSE BLDC GLUCOMTR-MCNC: 140 MG/DL — HIGH (ref 70–99)
GLUCOSE BLDC GLUCOMTR-MCNC: 156 MG/DL — HIGH (ref 70–99)
GLUCOSE BLDC GLUCOMTR-MCNC: 158 MG/DL — HIGH (ref 70–99)
GLUCOSE BLDC GLUCOMTR-MCNC: 162 MG/DL — HIGH (ref 70–99)
GLUCOSE SERPL-MCNC: 138 MG/DL — HIGH (ref 70–99)
HCO3 BLDA-SCNC: 18 MMOL/L — LOW (ref 21–28)
HCT VFR BLD CALC: 43.9 % — SIGNIFICANT CHANGE UP (ref 34.5–45)
HGB BLD-MCNC: 15.6 G/DL — HIGH (ref 11.5–15.5)
HOROWITZ INDEX BLDA+IHG-RTO: 21 — SIGNIFICANT CHANGE UP
MCHC RBC-ENTMCNC: 32.5 PG — SIGNIFICANT CHANGE UP (ref 27–34)
MCHC RBC-ENTMCNC: 35.5 GM/DL — SIGNIFICANT CHANGE UP (ref 32–36)
MCV RBC AUTO: 91.5 FL — SIGNIFICANT CHANGE UP (ref 80–100)
NRBC # BLD: 0 /100 WBCS — SIGNIFICANT CHANGE UP (ref 0–0)
PCO2 BLDA: 25 MMHG — LOW (ref 32–45)
PH BLDA: 7.47 — HIGH (ref 7.35–7.45)
PLATELET # BLD AUTO: 310 K/UL — SIGNIFICANT CHANGE UP (ref 150–400)
PO2 BLDA: 96 MMHG — SIGNIFICANT CHANGE UP (ref 83–108)
POTASSIUM SERPL-MCNC: 3.5 MMOL/L — SIGNIFICANT CHANGE UP (ref 3.5–5.3)
POTASSIUM SERPL-SCNC: 3.5 MMOL/L — SIGNIFICANT CHANGE UP (ref 3.5–5.3)
RBC # BLD: 4.8 M/UL — SIGNIFICANT CHANGE UP (ref 3.8–5.2)
RBC # FLD: 12.3 % — SIGNIFICANT CHANGE UP (ref 10.3–14.5)
SAO2 % BLDA: 99.2 % — HIGH (ref 94–98)
SODIUM SERPL-SCNC: 137 MMOL/L — SIGNIFICANT CHANGE UP (ref 135–145)
SPECIMEN SOURCE: SIGNIFICANT CHANGE UP
WBC # BLD: 11.65 K/UL — HIGH (ref 3.8–10.5)
WBC # FLD AUTO: 11.65 K/UL — HIGH (ref 3.8–10.5)

## 2023-07-15 PROCEDURE — 93010 ELECTROCARDIOGRAM REPORT: CPT

## 2023-07-15 RX ORDER — POTASSIUM CHLORIDE 20 MEQ
40 PACKET (EA) ORAL ONCE
Refills: 0 | Status: COMPLETED | OUTPATIENT
Start: 2023-07-15 | End: 2023-07-15

## 2023-07-15 RX ORDER — FUROSEMIDE 40 MG
20 TABLET ORAL DAILY
Refills: 0 | Status: DISCONTINUED | OUTPATIENT
Start: 2023-07-15 | End: 2023-07-17

## 2023-07-15 RX ADMIN — Medication 1: at 11:42

## 2023-07-15 RX ADMIN — Medication 1: at 07:31

## 2023-07-15 RX ADMIN — Medication 40 MILLIEQUIVALENT(S): at 05:23

## 2023-07-15 RX ADMIN — DAPAGLIFLOZIN 10 MILLIGRAM(S): 10 TABLET, FILM COATED ORAL at 11:42

## 2023-07-15 RX ADMIN — Medication 20 MILLIGRAM(S): at 11:42

## 2023-07-15 RX ADMIN — PANTOPRAZOLE SODIUM 40 MILLIGRAM(S): 20 TABLET, DELAYED RELEASE ORAL at 05:23

## 2023-07-15 NOTE — CONSULT NOTE ADULT - SUBJECTIVE AND OBJECTIVE BOX
07-15-23 @ 12:05    Patient is a 63y old  Female who presents with a chief complaint of Chest pain (15 Jul 2023 08:53)      HPI:  Patient is a 63 year old female with a PMHx of history of hypertension (on Ramipril 2.5), hyperlipidemia (on Simvastatin), diabetes who presents to [facility] as transfer from [place] emergency department for NSTEMI.  Patient reports that she found out her nephew had passed away on day of presentation and began to experience chest pain. Patient reports that her chest pain worsened around 7 PM prompting her arrival. Patient reports she she had an episode of emesis and continued chest pressure. Patient has a significant family history of her Mother passing away from MI in her 60s and father with CAD. Patient admits to recent cough over the course of the past week for which she was started on Tessalon Perle for and a short course of steroids. Patient reports that she took 1 steroid dose. Reports cough with clear-yellow phlegm. Patient denies shortness of breath, palpitations.      (2023 10:29)    SHE HAS NO UNDERLYING LUNG PROBLEM ;  SHE GETS BRONCHITIS OCCASIONALLY:    SHE HAS BEEN COUGHING FOR ABOUT A WEEK ; AND COUGH HAD STARTED PRIOR TO HER NEPHEW DEMISE AND HER GETTING " WORKED UP " FOR ALL THE WORK SHE WAS DOING AFTER THE DEATH :  THEN SHE STARTED EXPERIENCING CHEST PAIN UNDER THE LEFT BREAST AND CURRENTLY ALSO HAS SOME BACK PAIN ON THE RIGHT LOWER SIDE OF THORACIC AREA:      ?FOLLOWING PRESENT  [X ] Hx of PE/DVT, [ X] Hx COPD, [ ]X Hx of Asthma, [ ]X Hx of Hospitalization, [ ]X  Hx of BiPAP/CPAP use, [ ]X Hx of ALAYNA    Allergies    No Known Allergies    Intolerances        PAST MEDICAL & SURGICAL HISTORY:  DM (diabetes mellitus)      GERD (gastroesophageal reflux disease)      Bronchitis      H/O:           FAMILY HISTORY:      Social History: [  ] TOBACCO                  [  ]X ETOH                                 [  X] IVDA/DRUGS  X  REVIEW OF SYSTEMS      General:	X    Skin/Breast:X  	  Ophthalmologic:  	X  ENMT:	X    Respiratory and Thorax:  COUGH , SOB,  CHEST PAIN , CLEAR PHLEGM   	  Cardiovascular:	x    Gastrointestinal:	x    Genitourinary:	x    Musculoskeletal:	x    Neurological:	x    Psychiatric:	x    Hematology/Lymphatics:	x    Endocrine:	x    Allergic/Immunologic:	x    MEDICATIONS  (STANDING):  dapagliflozin 10 milliGRAM(s) Oral every 24 hours  dextrose 5%. 1000 milliLiter(s) (100 mL/Hr) IV Continuous <Continuous>  dextrose 5%. 1000 milliLiter(s) (50 mL/Hr) IV Continuous <Continuous>  dextrose 50% Injectable 25 Gram(s) IV Push once  dextrose 50% Injectable 25 Gram(s) IV Push once  dextrose 50% Injectable 12.5 Gram(s) IV Push once  furosemide    Tablet 20 milliGRAM(s) Oral daily  glucagon  Injectable 1 milliGRAM(s) IntraMuscular once  insulin lispro (ADMELOG) corrective regimen sliding scale   SubCutaneous three times a day before meals  insulin lispro (ADMELOG) corrective regimen sliding scale   SubCutaneous at bedtime  metoprolol succinate ER 25 milliGRAM(s) Oral daily  pantoprazole    Tablet 40 milliGRAM(s) Oral before breakfast  sodium chloride 0.9%. 1000 milliLiter(s) (75 mL/Hr) IV Continuous <Continuous>  valsartan 20 milliGRAM(s) Oral two times a day    MEDICATIONS  (PRN):  albuterol/ipratropium for Nebulization 3 milliLiter(s) Nebulizer every 6 hours PRN Shortness of Breath and/or Wheezing  benzonatate 100 milliGRAM(s) Oral every 8 hours PRN Cough  dextrose Oral Gel 15 Gram(s) Oral once PRN Blood Glucose LESS THAN 70 milliGRAM(s)/deciliter       Vital Signs Last 24 Hrs  T(C): 36.6 (15 Jul 2023 08:28), Max: 36.9 (2023 16:25)  T(F): 97.8 (15 Jul 2023 08:28), Max: 98.4 (2023 16:25)  HR: 79 (15 Jul 2023 08:28) (73 - 104)  BP: 95/52 (15 Jul 2023 08:28) (91/52 - 107/66)  BP(mean): 62 (15 Jul 2023 08:28) (61 - 76)  RR: 17 (15 Jul 2023 08:28) (16 - 17)  SpO2: 95% (15 Jul 2023 08:28) (95% - 98%)    Parameters below as of 15 Jul 2023 08:28  Patient On (Oxygen Delivery Method): room air    Orthostatic VS          I&O's Summary    2023 07:01  -  15 Jul 2023 07:00  --------------------------------------------------------  IN: 480 mL / OUT: 0 mL / NET: 480 mL    15 Jul 2023 07:01  -  15 Jul 2023 12:05  --------------------------------------------------------  IN: 240 mL / OUT: 0 mL / NET: 240 mL        Physical Exam:   GENERAL: NAD, well-groomed, well-developed  HEENT: KAYLEEN/   Atraumatic, Normocephalic  ENMT: No tonsillar erythema, exudates, or enlargement; Moist mucous membranes, Good dentition, No lesions  NECK: Supple, No JVD, Normal thyroid  CHEST/LUNG: Clear to auscultation bilaterally- clear: no pint tenderness on back: no wheezing  CVS: Regular rate and rhythm; No murmurs, rubs, or gallops  GI: : Soft, Nontender, Nondistended; Bowel sounds present  NERVOUS SYSTEM:  Alert & Oriented X3  EXTREMITIES:  - edema  LYMPH: No lymphadenopathy noted  SKIN: No rashes or lesions  ENDOCRINOLOGY: No Thyromegaly  PSYCH: Appropriate    Labs:  ABG - ( 15 Jul 2023 11:32 )  pH, Arterial: 7.47  pH, Blood: x     /  pCO2: 25    /  pO2: 96    / HCO3: 18    / Base Excess: -3.8  /  SaO2: 99.2                                        15.6   11.65 )-----------( 310      ( 15 Jul 2023 00:45 )             43.9                         16.5   17.17 )-----------( 310      ( 2023 01:41 )             47.9                         16.0   18.59 )-----------( 343      ( 2023 06:18 )             45.8                         16.0   20.76 )-----------( 355      ( 2023 22:59 )             45.5     07-15    137  |  101  |  26<H>  ----------------------------<  138<H>  3.5   |  19<L>  |  0.54  07-14    137  |  99  |  15  ----------------------------<  155<H>  3.7   |  14<L>  |  0.55  07-13    137  |  99  |  16  ----------------------------<  177<H>  3.8   |  15<L>  |  0.48<L>  07-12    135  |  98  |  23  ----------------------------<  199<H>  4.1   |  25  |  0.68    Ca    9.3      15 Jul 2023 00:45  Ca    9.6      2023 01:41    TPro  7.4  /  Alb  4.5  /  TBili  2.3<H>  /  DBili  x   /  AST  34  /  ALT  33  /  AlkPhos  79  07-14  TPro  6.9  /  Alb  4.4  /  TBili  3.0<H>  /  DBili  x   /  AST  40  /  ALT  41  /  AlkPhos  80  07-13  TPro  7.7  /  Alb  4.3  /  TBili  2.4<H>  /  DBili  x   /  AST  42<H>  /  ALT  59  /  AlkPhos  94  07-12    CAPILLARY BLOOD GLUCOSE      POCT Blood Glucose.: 162 mg/dL (15 Jul 2023 11:13)  POCT Blood Glucose.: 158 mg/dL (15 Jul 2023 07:26)  POCT Blood Glucose.: 118 mg/dL (2023 21:21)  POCT Blood Glucose.: 119 mg/dL (2023 16:01)    LIVER FUNCTIONS - ( 2023 01:41 )  Alb: 4.5 g/dL / Pro: 7.4 g/dL / ALK PHOS: 79 U/L / ALT: 33 U/L / AST: 34 U/L / GGT: x           PTT - ( 2023 01:41 )  PTT:23.8 sec  Urinalysis Basic - ( 15 Jul 2023 00:45 )    Color: x / Appearance: x / SG: x / pH: x  Gluc: 138 mg/dL / Ketone: x  / Bili: x / Urobili: x   Blood: x / Protein: x / Nitrite: x   Leuk Esterase: x / RBC: x / WBC x   Sq Epi: x / Non Sq Epi: x / Bacteria: x      Culture - Blood (collected 2023 04:57)  Source: .Blood Blood  Preliminary Report (15 Jul 2023 10:01):    No growth at 24 hours    Culture - Sputum (collected 2023 04:57)  Source: .Sputum Sputum  Gram Stain (2023 12:31):    Moderate polymorphonuclear leukocytes per low power field    Few Squamous epithelial cells per low power field    Moderate Gram Positive Cocci in Pairs and Chains per oil power field    Few Gram Negative Rods per oil power field    Few Gram Variable Rods per oil power field    Few Gram Negative Diplococci per oil power field  Preliminary Report (15 Jul 2023 09:27):    Normal Respiratory Carla present    Culture - Blood (collected 2023 01:35)  Source: .Blood Blood  Preliminary Report (15 Jul 2023 03:01):    No growth at 24 hours      D DImer  Procalcitonin, Serum: 0.27 ng/mL ( @ 01:41)  D-Dimer Assay, Quantitative: <150 ng/mL DDU ( @ 22:59)      Studies  Chest X-RAY  CT SCAN Chest   CT Abdomen  Venous Dopplers: LE:   Others      rad< from: US Abdomen Complete (US Abdomen Complete .) (23 @ 20:01) >  surface is smooth.  Bile ducts: Normal caliber. Common bile duct measures 3.5 mm.  Gallbladder: Within normal limits.  Pancreas: Visualized portions are within normal limits.  Spleen: 9.1 cm. Within normal limits.  Right kidney: 11.8 cm. No hydronephrosis.  Left kidney: 11.3 cm. No hydronephrosis.  Ascites: None.  Aorta and IVC: Visualized portions are within normal limits.    IMPRESSION:  Hepatomegaly and hepatic steatosis.    No sonographic evidence of cholelithiasis, acute cholecystitis or dilated   bile ducts.    --- End of Report ---            TWILA CRAWFORD MD; Attending Radiologist  This document has been electronically signed. 2023  9:52PM    < end of copied text >  < from: Xray Chest 1 View- PORTABLE-Urgent (23 @ 22:50) >  Overlying EKG leadsand wires.    FINDINGS:  Heart/Vascular: The heart size, mediastinum, hilum and aorta are within   normal limits for projection.  Pulmonary: Midline trachea. There is mild increased interstitial   markings.. There is no focal consolidation or gross effusion.  Bones: There is no fracture.  Lines and catheter: None    Impression:    Increased interstitial markings may reflect congestion. Patient has   underlying history of bronchitis and this is within the differential   diagnosis.    --- End of Report ---             TONYA MONTANO DO; Attending Radiologist  This document has been electronically signed. 2023 10:07AM    < end of copied text >  < from: CT Chest w/ IV Cont (21 @ 17:18) >  right L1 and L2 transverse processes. Degenerative changes.    IMPRESSION:    1. Acute fractures of the posterior right ninthand 12th ribs. Acute minimally displaced fractures of the right L1 and L2 transverse processes. No evidence of solid organ injury. No pneumothorax.    2. Nonspecific small bilateral breast nodules measuring up to 1 cm on the left. Correlation with mammography/ultrasound is recommended.      < end of copied text >  < from: TTE W or WO Ultrasound Enhancing Agent (23 @ 13:06) >                      benefits.                      Endocardial visualization enhanced with 4 ml of Definity                      Ultrasound enhancing agent (Lot#:[id number] Exp.Date:2024                      Discarded Dose:6ml).  UEA Reaction:       Patient had no adverse reaction after injection of                      Ultrasound Enhancing Agent.  Study Information:  Image quality for this study is adequate.    _______________________________________________________________________________________  CONCLUSIONS:      1. Normal left ventricular cavity size. The left ventricular systolic function is severely decreased with an ejection fraction of 26 % by Lewis's method of disks. There are regional wall motion abnormalities No evidence of a thrombus in the left ventricle.   2. Multiple segmental abnormalities exist. See findings.   3. Normal right ventricular cavity size and normal right ventricular systolic function.   4. The left atrium is mildly dilated.   5. The right atrium is normal.   6. No pericardial effusion seen.   7. There is moderate tricuspid regurgitation. Estimated pulmonary artery systolic pressure is 60 mmHg.   8. No prior echocardiogram is available for comparison.    < end of copied text >        < from: Cardiac Catheterization (23 @ 15:47) >    No significant angiographic evidence of CAD    Ventriculogram consistent with Mayra CLEMENTE   Recommendations:     < end of copied text >

## 2023-07-15 NOTE — CONSULT NOTE ADULT - ASSESSMENT
Patient is a 63 year old female with a PMHx of history of hypertension (on Ramipril), hyperlipidemia (on Simvastatin), diabetes who presents to [place] as transfer from [place] emergency department for NSTEMI.  Patient reports that she found out her nephew had passed away on day of presentation and began to experience chest pain. Patient reports that her chest pain worsened around 7 PM prompting her arrival. Patient reports she she had an episode of emesis and continued chest pressure. Patient admits to abdominal discomfort. Patient has a significant family history of her Mother passing away from MI in her 60s and father with CAD. Patient admits to recent cough over the course of the past week for which she was started on Tessalon Perle for and a short course of steroids. Patient reports that she took 1 steroid dose. Reports cough with clear-yellow phlegm. Patient denies shortness of breath, palpitations.         Chest pain   Elevated Hgb   HTN  HLD  DM    Chest Pain   -he presented with left sided chest pain  underneath the left breast and this symptoms started after her nephews death:   -she had cath done and found to have low EF:  and is consistent with TAKASUBO'S disease: Currently on rx:   -Her d dimer was normal : but she is still having pain :  -she had Abg done today with mild resp alkalosis  goes along with anxiety:  However she will get CTA any way today   -Her hest xray with increased interstitial markings and had leucocytosis on admission ut was taking steroids as an outpt; :  -All her symptoms are likely explained by stress/ extreme anxiety   Elevated Hgb   -still slightly high: ? dry  HTN  -blood pressure is running soft at this time:   -lasix being held   HLD  -per primary team  DM;  -monitor and control :    jacob acp at bedside:   and pt and

## 2023-07-15 NOTE — PROGRESS NOTE ADULT - SUBJECTIVE AND OBJECTIVE BOX
SUBJECTIVE / OVERNIGHT EVENTS:      Patient seen and examined at bedside. No events noted overnight. Resting in bed. Back pain noted/ dry nose. Family at bedside    --------------------------------------------------------------------------------------------  LABS:                        15.6   11.65 )-----------( 310      ( 15 Jul 2023 00:45 )             43.9     07-15    137  |  101  |  26<H>  ----------------------------<  138<H>  3.5   |  19<L>  |  0.54    Ca    9.3      15 Jul 2023 00:45    TPro  7.4  /  Alb  4.5  /  TBili  2.3<H>  /  DBili  x   /  AST  34  /  ALT  33  /  AlkPhos  79  07-14    PTT - ( 14 Jul 2023 01:41 )  PTT:23.8 sec  CAPILLARY BLOOD GLUCOSE      POCT Blood Glucose.: 158 mg/dL (15 Jul 2023 07:26)  POCT Blood Glucose.: 118 mg/dL (14 Jul 2023 21:21)  POCT Blood Glucose.: 119 mg/dL (14 Jul 2023 16:01)  POCT Blood Glucose.: 171 mg/dL (14 Jul 2023 11:35)        Urinalysis Basic - ( 15 Jul 2023 00:45 )    Color: x / Appearance: x / SG: x / pH: x  Gluc: 138 mg/dL / Ketone: x  / Bili: x / Urobili: x   Blood: x / Protein: x / Nitrite: x   Leuk Esterase: x / RBC: x / WBC x   Sq Epi: x / Non Sq Epi: x / Bacteria: x        RADIOLOGY & ADDITIONAL TESTS:    Imaging Personally Reviewed:  [x] YES  [ ] NO    Consultant(s) Notes Reviewed:  [x] YES  [ ] NO    MEDICATIONS  (STANDING):  dapagliflozin 10 milliGRAM(s) Oral every 24 hours  dextrose 5%. 1000 milliLiter(s) (100 mL/Hr) IV Continuous <Continuous>  dextrose 5%. 1000 milliLiter(s) (50 mL/Hr) IV Continuous <Continuous>  dextrose 50% Injectable 25 Gram(s) IV Push once  dextrose 50% Injectable 25 Gram(s) IV Push once  dextrose 50% Injectable 12.5 Gram(s) IV Push once  furosemide   Injectable 40 milliGRAM(s) IV Push daily  glucagon  Injectable 1 milliGRAM(s) IntraMuscular once  insulin lispro (ADMELOG) corrective regimen sliding scale   SubCutaneous three times a day before meals  insulin lispro (ADMELOG) corrective regimen sliding scale   SubCutaneous at bedtime  metoprolol succinate ER 25 milliGRAM(s) Oral daily  pantoprazole    Tablet 40 milliGRAM(s) Oral before breakfast  sodium chloride 0.9%. 1000 milliLiter(s) (75 mL/Hr) IV Continuous <Continuous>  valsartan 20 milliGRAM(s) Oral two times a day    MEDICATIONS  (PRN):  albuterol/ipratropium for Nebulization 3 milliLiter(s) Nebulizer every 6 hours PRN Shortness of Breath and/or Wheezing  benzonatate 100 milliGRAM(s) Oral every 8 hours PRN Cough  dextrose Oral Gel 15 Gram(s) Oral once PRN Blood Glucose LESS THAN 70 milliGRAM(s)/deciliter      Care Discussed with Consultants/Other Providers [x] YES  [ ] NO    Vital Signs Last 24 Hrs  T(C): 36.6 (15 Jul 2023 08:28), Max: 36.9 (14 Jul 2023 16:25)  T(F): 97.8 (15 Jul 2023 08:28), Max: 98.4 (14 Jul 2023 16:25)  HR: 79 (15 Jul 2023 08:28) (73 - 104)  BP: 95/52 (15 Jul 2023 08:28) (91/52 - 111/60)  BP(mean): 62 (15 Jul 2023 08:28) (61 - 76)  RR: 17 (15 Jul 2023 08:28) (16 - 17)  SpO2: 95% (15 Jul 2023 08:28) (95% - 98%)    Parameters below as of 15 Jul 2023 08:28  Patient On (Oxygen Delivery Method): room air      I&O's Summary    14 Jul 2023 07:01  -  15 Jul 2023 07:00  --------------------------------------------------------  IN: 480 mL / OUT: 0 mL / NET: 480 mL    15 Jul 2023 07:01  -  15 Jul 2023 08:54  --------------------------------------------------------  IN: 240 mL / OUT: 0 mL / NET: 240 mL        PHYSICAL EXAM:  GENERAL: NAD, well-developed, comfortable  HEAD:  Atraumatic, Normocephalic  EYES: EOMI, PERRLA, conjunctiva and sclera clear  NECK: Supple, No JVD  CHEST/LUNG: Clear to auscultation bilaterally; No wheeze  HEART: Regular rate and rhythm; No murmurs, rubs, or gallops  ABDOMEN: Soft, Nontender, Nondistended; Bowel sounds present  NEURO: AAOx3, no focal weakness, 5/5 b/l extremity strength, b/l knee no arthritis, no effusion   EXTREMITIES:  2+ Peripheral Pulses, No clubbing, cyanosis, or edema, right radial dag c/d/i, + pulses  SKIN: No rashes or lesions

## 2023-07-15 NOTE — PROGRESS NOTE ADULT - ASSESSMENT
Patient is a 63 year old female with a PMHx of history of hypertension (on Ramipril), hyperlipidemia (on Simvastatin), diabetes who presents to [place] as transfer from [place] emergency department for NSTEMI. Consult called for co medical mgmt.    # Chest pain r/o ACS:  - Concern for NSTEMI  - ECG NSR  - Monitor on Tele   - s/p cardiac cath 7/13 with no significant angiographic evidence of CAD. Ventriculogram consistent with Takatsubo CM. Recommended for medical mgmt.   - Likely stress induced CM with EF of 26%  - Per Cards-> d/c Heparin gtt, ASA, statin as cath with patent cors  - BP remains soft  - Cards following    # R/O Bronchitis:   - CXR with congestion / bronchitis  - s/p Prednisone 10 x 5 days outpatient  - Tessalon Perle 100 Q8 PRN   - Duoneb PRN     # Abdominal discomfort, Elevated T Bili:  - Abd US with Hepatomegaly and hepatic steatosis. No sonographic evidence of cholelithiasis, acute cholecystitis or dilated bile ducts.    # Leukocytosis:   - Likely due to reactivity versus bronchitis   - Follow up BCx2 and UCx   - Monitor off of ABX for now   - Monitor temps/WBC    # HLD/HTN:  - C/w CV meds per Cards    # DM:  - Hold home DM medications  - Sliding scale   - A1C 6.6%  - Diabetic DASH diet     # Recent Death:   - Pt reports passing of her Nephew preceding her symptoms  - Psychiatry eval offered, patient declined     # GI ppx:  - Protonix    # DVT ppx:  - Heparin gtt

## 2023-07-15 NOTE — PROVIDER CONTACT NOTE (OTHER) - ASSESSMENT
HR on vitals machine 49 and on tele monitor HR between . On palpation, HR 48. Other VSS, see flowsheet. Pt denies chest pain, SOB, dizziness, lightheadedness. Pt states that occasionally when she walks she feels her heart fluttering. HR on vitals machine 49 and on tele monitor HR between  with PACs. On palpation, HR 48. Other VSS, see flowsheet. Pt denies chest pain, SOB, dizziness, lightheadedness. Pt states that occasionally when she walks she feels her heart fluttering.

## 2023-07-15 NOTE — CHART NOTE - NSCHARTNOTEFT_GEN_A_CORE
Medicine PA Note    Notified by RN that patient's HR on dynamed is 48-49, however: NSR, HR: 80-90 on tele. Pt is assessed at bedside, no acute distress is noted. Patient is asymptomatic, mentating well with stable vitals.   EKG performed: NSR with PACs, with HR: 90s.  Palpated at bedside, HR: ~80s.   Will continue to monitor on tele overnight   Discussed with patient to alert RN if she becomes symptomatic  RN aware of management   Will endorse to day team on overnight events     Grisel Garcia PA-C   Dept of Medicine   [id number]

## 2023-07-15 NOTE — PROGRESS NOTE ADULT - SUBJECTIVE AND OBJECTIVE BOX
DATE OF SERVICE: 07-15-23 @ 17:47    Patient is a 63y old  Female who presents with a chief complaint of Chest pain (15 Jul 2023 12:05)      INTERVAL HISTORY: no complaints     REVIEW OF SYSTEMS:  CONSTITUTIONAL: No weakness  EYES/ENT: No visual changes;  No throat pain   NECK: No pain or stiffness  RESPIRATORY: No cough, wheezing; No shortness of breath  CARDIOVASCULAR: No chest pain or palpitations  GASTROINTESTINAL: No abdominal  pain. No nausea, vomiting, or hematemesis  GENITOURINARY: No dysuria, frequency or hematuria  NEUROLOGICAL: No stroke like symptoms  SKIN: No rashes      	  MEDICATIONS:  furosemide    Tablet 20 milliGRAM(s) Oral daily  metoprolol succinate ER 25 milliGRAM(s) Oral daily  valsartan 20 milliGRAM(s) Oral two times a day        PHYSICAL EXAM:  T(C): 36.4 (07-15-23 @ 17:41), Max: 36.7 (23 @ 20:15)  HR: 48 (07-15-23 @ 17:41) (48 - 92)  BP: 128/75 (07-15-23 @ 17:41) (91/52 - 128/75)  RR: 18 (07-15-23 @ 17:41) (16 - 18)  SpO2: 99% (07-15-23 @ 17:41) (95% - 99%)  Wt(kg): --  I&O's Summary    2023 07:01  -  15 Jul 2023 07:00  --------------------------------------------------------  IN: 480 mL / OUT: 0 mL / NET: 480 mL    15 Jul 2023 07:  -  15 Jul 2023 17:47  --------------------------------------------------------  IN: 240 mL / OUT: 0 mL / NET: 240 mL          Appearance: In no distress	  HEENT:    PERRL, EOMI	  Cardiovascular:  S1 S2, No JVD  Respiratory: Lungs clear to auscultation	  Gastrointestinal:  Soft, Non-tender, + BS	  Vascularature:  No edema of LE  Psychiatric: Appropriate affect   Neuro: no acute focal deficits                               15.6   11.65 )-----------( 310      ( 15 Jul 2023 00:45 )             43.9     07-15    137  |  101  |  26<H>  ----------------------------<  138<H>  3.5   |  19<L>  |  0.54    Ca    9.3      15 Jul 2023 00:45    TPro  7.4  /  Alb  4.5  /  TBili  2.3<H>  /  DBili  x   /  AST  34  /  ALT  33  /  AlkPhos  79          Labs personally reviewed      ASSESSMENT/PLAN: 	     63-year-old female history of hypertension, hyperlipidemia, diabetes presenting to [place] emergency department as transfer from [place] emergency department for NSTEMI.  Patient states found out nephew had  earlier today and began having chest pain that worsened around 7 PM tonight had episodes of emesis and continued chest pressure.  Also endorsing abdominal discomfort during this same timeframe.  Patient's mother passed away at 64 from MI. Her father also has hx of CAD/PCIs.  Found to have elevated troponin.  Denies recent shortness of breath, palpitations, edema, orthopnea or syncope.     1. Chest Pain  - ECG NSR  - Trop 5134 -- 492  - D-Dimer negative  - Multiple risk factors for CAD- FHx of CAD (mother and father), HTN, HLD, DM  - d/c Heparin gtt, ASA, statin as cath with patent cors  - Likely stress induced CM with EF of 26%    2. Hypertension  - Note son ramipril for renal protection and BP typically wnl  - can resume ramipril as BP tolerates    3. HLD  - Check lipid panel  - c/w simvastatin 20mg PO daily    4. Acute systolic HF  - Stress induced CM with EF of 26%  - GDMT - started Toprol 25mg, Farxiga 10mg, Valsartan 20mg BID   - d/c IV Lasix  - transition to Lasix 40mg PO daily    5. Acidosis - Bicarb trended from 15-->14 today  - rec ABG   - check lactate   - discussed with primary team Dr Lawson, ?PNA        ToRON Oconnell DO [facility]  Cardiovascular Medicine  [address] [place] [address], [address]  Office: [phone]  Available via call/text on [employer] Teams  DATE OF SERVICE: 07-15-23 @ 17:47    Patient is a 63y old  Female who presents with a chief complaint of Chest pain (15 Jul 2023 12:05)      INTERVAL HISTORY: no complaints     REVIEW OF SYSTEMS:  CONSTITUTIONAL: No weakness  EYES/ENT: No visual changes;  No throat pain   NECK: No pain or stiffness  RESPIRATORY: No cough, wheezing; No shortness of breath  CARDIOVASCULAR: No chest pain or palpitations  GASTROINTESTINAL: No abdominal  pain. No nausea, vomiting, or hematemesis  GENITOURINARY: No dysuria, frequency or hematuria  NEUROLOGICAL: No stroke like symptoms  SKIN: No rashes      	  MEDICATIONS:  furosemide    Tablet 20 milliGRAM(s) Oral daily  metoprolol succinate ER 25 milliGRAM(s) Oral daily  valsartan 20 milliGRAM(s) Oral two times a day        PHYSICAL EXAM:  T(C): 36.4 (07-15-23 @ 17:41), Max: 36.7 (23 @ 20:15)  HR: 48 (07-15-23 @ 17:41) (48 - 92)  BP: 128/75 (07-15-23 @ 17:41) (91/52 - 128/75)  RR: 18 (07-15-23 @ 17:41) (16 - 18)  SpO2: 99% (07-15-23 @ 17:41) (95% - 99%)  Wt(kg): --  I&O's Summary    2023 07:01  -  15 Jul 2023 07:00  --------------------------------------------------------  IN: 480 mL / OUT: 0 mL / NET: 480 mL    15 Jul 2023 07:  -  15 Jul 2023 17:47  --------------------------------------------------------  IN: 240 mL / OUT: 0 mL / NET: 240 mL          Appearance: In no distress	  HEENT:    PERRL, EOMI	  Cardiovascular:  S1 S2, No JVD  Respiratory: Lungs clear to auscultation	  Gastrointestinal:  Soft, Non-tender, + BS	  Vascularature:  No edema of LE  Psychiatric: Appropriate affect   Neuro: no acute focal deficits                               15.6   11.65 )-----------( 310      ( 15 Jul 2023 00:45 )             43.9     07-15    137  |  101  |  26<H>  ----------------------------<  138<H>  3.5   |  19<L>  |  0.54    Ca    9.3      15 Jul 2023 00:45    TPro  7.4  /  Alb  4.5  /  TBili  2.3<H>  /  DBili  x   /  AST  34  /  ALT  33  /  AlkPhos  79  -        Labs personally reviewed      ASSESSMENT/PLAN: 	     63-year-old female history of hypertension, hyperlipidemia, diabetes presenting to [place] emergency department as transfer from [place] emergency department for NSTEMI.  Patient states found out nephew had  earlier today and began having chest pain that worsened around 7 PM tonight had episodes of emesis and continued chest pressure.  Also endorsing abdominal discomfort during this same timeframe.  Patient's mother passed away at 64 from MI. Her father also has hx of CAD/PCIs.  Found to have elevated troponin.  Denies recent shortness of breath, palpitations, edema, orthopnea or syncope.     1. Chest Pain  - ECG NSR  - Trop 5134 -- 492  - D-Dimer negative  - Multiple risk factors for CAD- FHx of CAD (mother and father), HTN, HLD, DM  - d/c Heparin gtt, ASA, statin as cath with patent cors  - Likely stress induced CM with EF of 26%    2. Hypertension  - Valsartan in place of Ramipril to allow transition to Entresto    3. HLD  - Check lipid panel  - c/w simvastatin 20mg PO daily    4. Acute systolic HF  - Stress induced CM with EF of 26%  - GDMT - started Toprol 25mg, Farxiga 10mg, Valsartan 20mg BID   - d/c IV Lasix  - transition to Lasix 20mg PO daily    5. Acidosis - Bicarb trended from 15-->14 -->19  - Per primary team          RON Augustine DO [facility]  Cardiovascular Medicine  [address], [address]  Office: [phone]  Available via call/text on [employer] Teams

## 2023-07-15 NOTE — CONSULT NOTE ADULT - REASON FOR ADMISSION
[FreeTextEntry1] : Essential (primary) hypertension - I10 (Primary), Cont lisinopril-hydrochlorothiazide and low salt diet. Urged to increase physical activity and cont efforts at wt loss. Bp control is GOOD. \par \par Pure hyperglyceridemia - E78.1, Tg's high occasionally but no sx's sec to it and LDL remains ok. Follow. Continue atorvastatin and low cholesterol diet. Urged stronger efforts at weight loss. LDL goal is <130 at a minimum. Last LDL at goal 67 \par \par essential tremors- continue propranolol and neuro follow up\par \par Erectile dysfunction- likely a candidate for medication but will refer to  per his request for evaluation\par \par left hip pain- no abnormality at all noted on exam. As it is positional it is unlikely to be a permanent structural abnormality. If more persistent she can try Tylenol but at the present time no treatment is necessary.\par \par Risks/benefits of flu vaccine discussed with patient and patient understands and accepts.\par 
Chest pain
Chest pain

## 2023-07-16 LAB
CULTURE RESULTS: SIGNIFICANT CHANGE UP
GLUCOSE BLDC GLUCOMTR-MCNC: 141 MG/DL — HIGH (ref 70–99)
GLUCOSE BLDC GLUCOMTR-MCNC: 151 MG/DL — HIGH (ref 70–99)
GLUCOSE BLDC GLUCOMTR-MCNC: 162 MG/DL — HIGH (ref 70–99)
GLUCOSE BLDC GLUCOMTR-MCNC: 163 MG/DL — HIGH (ref 70–99)
GLUCOSE BLDC GLUCOMTR-MCNC: 167 MG/DL — HIGH (ref 70–99)
SPECIMEN SOURCE: SIGNIFICANT CHANGE UP

## 2023-07-16 PROCEDURE — 71275 CT ANGIOGRAPHY CHEST: CPT | Mod: 26

## 2023-07-16 RX ADMIN — Medication 20 MILLIGRAM(S): at 05:43

## 2023-07-16 RX ADMIN — Medication 1: at 09:40

## 2023-07-16 RX ADMIN — Medication 1: at 12:43

## 2023-07-16 RX ADMIN — DAPAGLIFLOZIN 10 MILLIGRAM(S): 10 TABLET, FILM COATED ORAL at 11:18

## 2023-07-16 RX ADMIN — PANTOPRAZOLE SODIUM 40 MILLIGRAM(S): 20 TABLET, DELAYED RELEASE ORAL at 05:43

## 2023-07-16 RX ADMIN — Medication 25 MILLIGRAM(S): at 17:34

## 2023-07-16 NOTE — PROGRESS NOTE ADULT - SUBJECTIVE AND OBJECTIVE BOX
Date of Service: 07-16-23 @ 12:54    Patient is a 63y old  Female who presents with a chief complaint of Chest pain (16 Jul 2023 06:59)      Any change in ROS: doing  ok : no sob:  on room air:     MEDICATIONS  (STANDING):  dapagliflozin 10 milliGRAM(s) Oral every 24 hours  dextrose 5%. 1000 milliLiter(s) (50 mL/Hr) IV Continuous <Continuous>  dextrose 5%. 1000 milliLiter(s) (100 mL/Hr) IV Continuous <Continuous>  dextrose 50% Injectable 25 Gram(s) IV Push once  dextrose 50% Injectable 25 Gram(s) IV Push once  dextrose 50% Injectable 12.5 Gram(s) IV Push once  furosemide    Tablet 20 milliGRAM(s) Oral daily  glucagon  Injectable 1 milliGRAM(s) IntraMuscular once  insulin lispro (ADMELOG) corrective regimen sliding scale   SubCutaneous three times a day before meals  insulin lispro (ADMELOG) corrective regimen sliding scale   SubCutaneous at bedtime  metoprolol succinate ER 25 milliGRAM(s) Oral daily  pantoprazole    Tablet 40 milliGRAM(s) Oral before breakfast  sodium chloride 0.9%. 1000 milliLiter(s) (75 mL/Hr) IV Continuous <Continuous>  valsartan 20 milliGRAM(s) Oral two times a day    MEDICATIONS  (PRN):  albuterol/ipratropium for Nebulization 3 milliLiter(s) Nebulizer every 6 hours PRN Shortness of Breath and/or Wheezing  benzonatate 100 milliGRAM(s) Oral every 8 hours PRN Cough  dextrose Oral Gel 15 Gram(s) Oral once PRN Blood Glucose LESS THAN 70 milliGRAM(s)/deciliter    Vital Signs Last 24 Hrs  T(C): 36.7 (16 Jul 2023 09:03), Max: 36.7 (15 Jul 2023 15:59)  T(F): 98 (16 Jul 2023 09:03), Max: 98 (15 Jul 2023 15:59)  HR: 97 (16 Jul 2023 09:45) (48 - 97)  BP: 105/68 (16 Jul 2023 09:45) (101/64 - 128/75)  BP(mean): 57 (15 Jul 2023 15:59) (57 - 57)  RR: 18 (16 Jul 2023 09:03) (16 - 18)  SpO2: 97% (16 Jul 2023 09:03) (96% - 99%)    Parameters below as of 16 Jul 2023 09:03  Patient On (Oxygen Delivery Method): room air        I&O's Summary    15 Jul 2023 07:01  -  16 Jul 2023 07:00  --------------------------------------------------------  IN: 480 mL / OUT: 0 mL / NET: 480 mL    16 Jul 2023 07:01  -  16 Jul 2023 12:54  --------------------------------------------------------  IN: 180 mL / OUT: 0 mL / NET: 180 mL          Physical Exam:   GENERAL: NAD, well-groomed, well-developed  HEENT: KAYLEEN/   Atraumatic, Normocephalic  ENMT: No tonsillar erythema, exudates, or enlargement; Moist mucous membranes, Good dentition, No lesions  NECK: Supple, No JVD, Normal thyroid  CHEST/LUNG: Clear to auscultaion  CVS: Regular rate and rhythm; No murmurs, rubs, or gallops  GI: : Soft, Nontender, Nondistended; Bowel sounds present  NERVOUS SYSTEM:  Alert & Oriented X3  EXTREMITIES:  2+ Peripheral Pulses, No clubbing, cyanosis, or edema  LYMPH: No lymphadenopathy noted  SKIN: No rashes or lesions  ENDOCRINOLOGY: No Thyromegaly  PSYCH: Appropriate    Labs:  ABG - ( 15 Jul 2023 11:32 )  pH, Arterial: 7.47  pH, Blood: x     /  pCO2: 25    /  pO2: 96    / HCO3: 18    / Base Excess: -3.8  /  SaO2: 99.2                                        15.6   11.65 )-----------( 310      ( 15 Jul 2023 00:45 )             43.9                         16.5   17.17 )-----------( 310      ( 14 Jul 2023 01:41 )             47.9                         16.0   18.59 )-----------( 343      ( 13 Jul 2023 06:18 )             45.8                         16.0   20.76 )-----------( 355      ( 12 Jul 2023 22:59 )             45.5     07-15    137  |  101  |  26<H>  ----------------------------<  138<H>  3.5   |  19<L>  |  0.54  07-14    137  |  99  |  15  ----------------------------<  155<H>  3.7   |  14<L>  |  0.55  07-13    137  |  99  |  16  ----------------------------<  177<H>  3.8   |  15<L>  |  0.48<L>  07-12    135  |  98  |  23  ----------------------------<  199<H>  4.1   |  25  |  0.68    Ca    9.3      15 Jul 2023 00:45    TPro  7.4  /  Alb  4.5  /  TBili  2.3<H>  /  DBili  x   /  AST  34  /  ALT  33  /  AlkPhos  79  07-14  TPro  6.9  /  Alb  4.4  /  TBili  3.0<H>  /  DBili  x   /  AST  40  /  ALT  41  /  AlkPhos  80  07-13  TPro  7.7  /  Alb  4.3  /  TBili  2.4<H>  /  DBili  x   /  AST  42<H>  /  ALT  59  /  AlkPhos  94  07-12    CAPILLARY BLOOD GLUCOSE      POCT Blood Glucose.: 162 mg/dL (16 Jul 2023 12:32)  POCT Blood Glucose.: 167 mg/dL (16 Jul 2023 09:26)  POCT Blood Glucose.: 163 mg/dL (16 Jul 2023 04:52)  POCT Blood Glucose.: 156 mg/dL (15 Jul 2023 21:56)  POCT Blood Glucose.: 140 mg/dL (15 Jul 2023 16:09)          Urinalysis Basic - ( 15 Jul 2023 00:45 )    Color: x / Appearance: x / SG: x / pH: x  Gluc: 138 mg/dL / Ketone: x  / Bili: x / Urobili: x   Blood: x / Protein: x / Nitrite: x   Leuk Esterase: x / RBC: x / WBC x   Sq Epi: x / Non Sq Epi: x / Bacteria: x      D-Dimer Assay, Quantitative: <150 ng/mL DDU (07-12 @ 22:59)  Procalcitonin, Serum: 0.27 ng/mL (07-14 @ 01:41)        RECENT CULTURES:  07-14 @ 04:57 .Sputum Sputum       Moderate polymorphonuclear leukocytes per low power field  Few Squamous epithelial cells per low power field  Moderate Gram Positive Cocci in Pairs and Chains per oil power field  Few Gram Negative Rods per oil power field  Few Gram Variable Rods per oil power field  Few Gram Negative Diplococci per oil power field           Normal Respiratory Carla present    07-14 @ 01:35 .Blood Blood                No growth at 48 Hours    07-13 @ 12:50 Clean Catch Clean Catch (Midstream)                >=3 organisms. Probable collection contamination.          RESPIRATORY CULTURES:        rad< from: US Abdomen Complete (US Abdomen Complete .) (07.13.23 @ 20:01) >        INTERPRETATION:  CLINICAL INFORMATION: Hyperbilirubinemia.    COMPARISON: None available.    TECHNIQUE: Sonography of the abdomen.    FINDINGS:  Liver: The right hepatic lobe measures 18.8 cm in length, enlarged.    Echogenic liver parenchyma compatible with hepatic steatosis.  The liver   surface is smooth.  Bile ducts: Normal caliber. Common bile duct measures 3.5 mm.  Gallbladder: Within normal limits.  Pancreas: Visualized portions are within normal limits.  Spleen: 9.1 cm. Within normal limits.  Right kidney: 11.8 cm. No hydronephrosis.  Left kidney: 11.3 cm. No hydronephrosis.  Ascites: None.  Aorta and IVC: Visualized portions are within normal limits.    IMPRESSION:  Hepatomegaly and hepatic steatosis.    No sonographic evidence of cholelithiasis, acute cholecystitis or dilated   bile ducts.    --- End of Report ---            TWILA CRAWFORD MD; Attending Radiologist  This document has been electronically signed. Jul 13 2023  9:52PM    < end of copied text >  < from: Xray Chest 1 View- PORTABLE-Urgent (07.12.23 @ 22:50) >    COMPARISON: None    Overlying EKG leadsand wires.    FINDINGS:  Heart/Vascular: The heart size, mediastinum, hilum and aorta are within   normal limits for projection.  Pulmonary: Midline trachea. There is mild increased interstitial   markings.. There is no focal consolidation or gross effusion.  Bones: There is no fracture.  Lines and catheter: None    Impression:    Increased interstitial markings may reflect congestion. Patient has   underlying history of bronchitis and this is within the differential   diagnosis.    --- End of Report ---             TONYA MONTANO DO; Attending Radiologist  This document has been electronically signed. Jul 13 2023 10:07AM    < end of copied text >    Studies  Chest X-RAY  CT SCAN Chest   Venous Dopplers: LE:   CT Abdomen  Others

## 2023-07-16 NOTE — PROGRESS NOTE ADULT - SUBJECTIVE AND OBJECTIVE BOX
DATE OF SERVICE: 23 @ 15:54    Patient is a 63y old  Female who presents with a chief complaint of Chest pain (2023 12:53)      INTERVAL HISTORY: no complaints     REVIEW OF SYSTEMS:  CONSTITUTIONAL: No weakness  EYES/ENT: No visual changes;  No throat pain   NECK: No pain or stiffness  RESPIRATORY: No cough, wheezing; No shortness of breath  CARDIOVASCULAR: No chest pain or palpitations  GASTROINTESTINAL: No abdominal  pain. No nausea, vomiting, or hematemesis  GENITOURINARY: No dysuria, frequency or hematuria  NEUROLOGICAL: No stroke like symptoms  SKIN: No rashes      	  MEDICATIONS:  furosemide    Tablet 20 milliGRAM(s) Oral daily  metoprolol succinate ER 25 milliGRAM(s) Oral daily  valsartan 20 milliGRAM(s) Oral two times a day        PHYSICAL EXAM:  T(C): 36.7 (23 @ 13:38), Max: 36.7 (07-15-23 @ 15:59)  HR: 91 (23 @ 13:38) (48 - 97)  BP: 103/69 (23 @ 13:38) (101/64 - 128/75)  RR: 18 (23 @ 13:38) (16 - 18)  SpO2: 96% (23 @ 13:38) (96% - 99%)  Wt(kg): --  I&O's Summary    15 Jul 2023 07:  -  2023 07:00  --------------------------------------------------------  IN: 480 mL / OUT: 0 mL / NET: 480 mL    2023 07:  -  2023 15:54  --------------------------------------------------------  IN: 480 mL / OUT: 0 mL / NET: 480 mL          Appearance: In no distress	  HEENT:    PERRL, EOMI	  Cardiovascular:  S1 S2, No JVD  Respiratory: Lungs clear to auscultation	  Gastrointestinal:  Soft, Non-tender, + BS	  Vascularature:  No edema of LE  Psychiatric: Appropriate affect   Neuro: no acute focal deficits                               15.6   11.65 )-----------( 310      ( 15 Jul 2023 00:45 )             43.9     07-15    137  |  101  |  26<H>  ----------------------------<  138<H>  3.5   |  19<L>  |  0.54    Ca    9.3      15 Jul 2023 00:45          Labs personally reviewed      ASSESSMENT/PLAN: 	     63-year-old female history of hypertension, hyperlipidemia, diabetes presenting to [place] emergency department as transfer from [place] emergency department for NSTEMI.  Patient states found out nephew had  earlier today and began having chest pain that worsened around 7 PM tonight had episodes of emesis and continued chest pressure.  Also endorsing abdominal discomfort during this same timeframe.  Patient's mother passed away at 64 from MI. Her father also has hx of CAD/PCIs.  Found to have elevated troponin.  Denies recent shortness of breath, palpitations, edema, orthopnea or syncope.     1. Chest Pain  - ECG NSR  - Trop 5134 -- 492  - D-Dimer negative  - Multiple risk factors for CAD- FHx of CAD (mother and father), HTN, HLD, DM  - d/c Heparin gtt, ASA, statin as cath with patent cors  - Likely stress induced CM with EF of 26%    2. Hypertension  - Valsartan in place of Ramipril to allow transition to Entresto    3. HLD  - Check lipid panel  - c/w simvastatin 20mg PO daily    4. Acute systolic HF  - Stress induced CM with EF of 26%  - GDMT - started Toprol 25mg, Farxiga 10mg, Valsartan 20mg BID   - d/c IV Lasix  - transition to Lasix 20mg PO daily    5. Acidosis - Bicarb trended from 15-->14 -->19  - Per primary team        RON Augustine DO [facility]  Cardiovascular Medicine  [address], [address]  Office: [phone]  Available via call/text on [employer] Teams  DATE OF SERVICE: 23 @ 15:54    Patient is a 63y old  Female who presents with a chief complaint of Chest pain (2023 12:53)      INTERVAL HISTORY: no complaints     REVIEW OF SYSTEMS:  CONSTITUTIONAL: No weakness  EYES/ENT: No visual changes;  No throat pain   NECK: No pain or stiffness  RESPIRATORY: No cough, wheezing; No shortness of breath  CARDIOVASCULAR: No chest pain or palpitations  GASTROINTESTINAL: No abdominal  pain. No nausea, vomiting, or hematemesis  GENITOURINARY: No dysuria, frequency or hematuria  NEUROLOGICAL: No stroke like symptoms  SKIN: No rashes      	  MEDICATIONS:  furosemide    Tablet 20 milliGRAM(s) Oral daily  metoprolol succinate ER 25 milliGRAM(s) Oral daily  valsartan 20 milliGRAM(s) Oral two times a day        PHYSICAL EXAM:  T(C): 36.7 (23 @ 13:38), Max: 36.7 (07-15-23 @ 15:59)  HR: 91 (23 @ 13:38) (48 - 97)  BP: 103/69 (23 @ 13:38) (101/64 - 128/75)  RR: 18 (23 @ 13:38) (16 - 18)  SpO2: 96% (23 @ 13:38) (96% - 99%)  Wt(kg): --  I&O's Summary    15 Jul 2023 07:  -  2023 07:00  --------------------------------------------------------  IN: 480 mL / OUT: 0 mL / NET: 480 mL    2023 07:  -  2023 15:54  --------------------------------------------------------  IN: 480 mL / OUT: 0 mL / NET: 480 mL          Appearance: In no distress	  HEENT:    PERRL, EOMI	  Cardiovascular:  S1 S2, No JVD  Respiratory: Lungs clear to auscultation	  Gastrointestinal:  Soft, Non-tender, + BS	  Vascularature:  No edema of LE  Psychiatric: Appropriate affect   Neuro: no acute focal deficits                               15.6   11.65 )-----------( 310      ( 15 Jul 2023 00:45 )             43.9     07-15    137  |  101  |  26<H>  ----------------------------<  138<H>  3.5   |  19<L>  |  0.54    Ca    9.3      15 Jul 2023 00:45          Labs personally reviewed      ASSESSMENT/PLAN: 	     63-year-old female history of hypertension, hyperlipidemia, diabetes presenting to [place] emergency department as transfer from [place] emergency department for NSTEMI.  Patient states found out nephew had  earlier today and began having chest pain that worsened around 7 PM tonight had episodes of emesis and continued chest pressure.  Also endorsing abdominal discomfort during this same timeframe.  Patient's mother passed away at 64 from MI. Her father also has hx of CAD/PCIs.  Found to have elevated troponin.  Denies recent shortness of breath, palpitations, edema, orthopnea or syncope.     1. Chest Pain  - ECG NSR  - Trop 5134 -- 492  - D-Dimer negative  - Multiple risk factors for CAD- FHx of CAD (mother and father), HTN, HLD, DM  - d/c Heparin gtt, ASA, statin as cath with patent cors  - Likely stress induced CM with EF of 26%    2. Hypertension  - Valsartan in place of Ramipril to allow transition to Entresto    3. HLD  - Check lipid panel  - c/w simvastatin 20mg PO daily    4. Acute systolic HF  - Stress induced CM with EF of 26%  - GDMT - started Toprol 25mg, Farxiga 10mg, Valsartan 20mg BID (transition to Entresto as OP)  - d/c IV Lasix  - transition to Lasix 20mg PO daily    5. Acidosis - Bicarb trended from 15-->14 -->19  - Per primary team        RON Augustine DO [facility]  Cardiovascular Medicine  [address], [address]  Office: [phone]  Available via call/text on Microsoft Teams

## 2023-07-16 NOTE — PROGRESS NOTE ADULT - SUBJECTIVE AND OBJECTIVE BOX
SUBJECTIVE / OVERNIGHT EVENTS:    Patient seen and examined at bedside. Bradycardia noted overnight. Resting comfortably in bed    --------------------------------------------------------------------------------------------  LABS:                        15.6   11.65 )-----------( 310      ( 15 Jul 2023 00:45 )             43.9     07-15    137  |  101  |  26<H>  ----------------------------<  138<H>  3.5   |  19<L>  |  0.54    Ca    9.3      15 Jul 2023 00:45        CAPILLARY BLOOD GLUCOSE      POCT Blood Glucose.: 163 mg/dL (16 Jul 2023 04:52)  POCT Blood Glucose.: 156 mg/dL (15 Jul 2023 21:56)  POCT Blood Glucose.: 140 mg/dL (15 Jul 2023 16:09)  POCT Blood Glucose.: 162 mg/dL (15 Jul 2023 11:13)  POCT Blood Glucose.: 158 mg/dL (15 Jul 2023 07:26)        Urinalysis Basic - ( 15 Jul 2023 00:45 )    Color: x / Appearance: x / SG: x / pH: x  Gluc: 138 mg/dL / Ketone: x  / Bili: x / Urobili: x   Blood: x / Protein: x / Nitrite: x   Leuk Esterase: x / RBC: x / WBC x   Sq Epi: x / Non Sq Epi: x / Bacteria: x        RADIOLOGY & ADDITIONAL TESTS:    Imaging Personally Reviewed:  [x] YES  [ ] NO    Consultant(s) Notes Reviewed:  [x] YES  [ ] NO    MEDICATIONS  (STANDING):  dapagliflozin 10 milliGRAM(s) Oral every 24 hours  dextrose 5%. 1000 milliLiter(s) (50 mL/Hr) IV Continuous <Continuous>  dextrose 5%. 1000 milliLiter(s) (100 mL/Hr) IV Continuous <Continuous>  dextrose 50% Injectable 25 Gram(s) IV Push once  dextrose 50% Injectable 12.5 Gram(s) IV Push once  dextrose 50% Injectable 25 Gram(s) IV Push once  furosemide    Tablet 20 milliGRAM(s) Oral daily  glucagon  Injectable 1 milliGRAM(s) IntraMuscular once  insulin lispro (ADMELOG) corrective regimen sliding scale   SubCutaneous three times a day before meals  insulin lispro (ADMELOG) corrective regimen sliding scale   SubCutaneous at bedtime  metoprolol succinate ER 25 milliGRAM(s) Oral daily  pantoprazole    Tablet 40 milliGRAM(s) Oral before breakfast  sodium chloride 0.9%. 1000 milliLiter(s) (75 mL/Hr) IV Continuous <Continuous>  valsartan 20 milliGRAM(s) Oral two times a day    MEDICATIONS  (PRN):  albuterol/ipratropium for Nebulization 3 milliLiter(s) Nebulizer every 6 hours PRN Shortness of Breath and/or Wheezing  benzonatate 100 milliGRAM(s) Oral every 8 hours PRN Cough  dextrose Oral Gel 15 Gram(s) Oral once PRN Blood Glucose LESS THAN 70 milliGRAM(s)/deciliter      Care Discussed with Consultants/Other Providers [x] YES  [ ] NO    Vital Signs Last 24 Hrs  T(C): 36.4 (16 Jul 2023 05:00), Max: 36.7 (15 Jul 2023 15:59)  T(F): 97.5 (16 Jul 2023 05:00), Max: 98 (15 Jul 2023 15:59)  HR: 54 (16 Jul 2023 05:00) (48 - 91)  BP: 111/76 (16 Jul 2023 05:00) (95/52 - 128/75)  BP(mean): 57 (15 Jul 2023 15:59) (57 - 62)  RR: 18 (16 Jul 2023 05:00) (16 - 18)  SpO2: 99% (16 Jul 2023 05:00) (95% - 99%)    Parameters below as of 16 Jul 2023 05:00  Patient On (Oxygen Delivery Method): room air      I&O's Summary    15 Jul 2023 07:01  -  16 Jul 2023 07:00  --------------------------------------------------------  IN: 360 mL / OUT: 0 mL / NET: 360 mL        PHYSICAL EXAM:  GENERAL: NAD, well-developed, comfortable  HEAD:  Atraumatic, Normocephalic  EYES: EOMI, PERRLA, conjunctiva and sclera clear  NECK: Supple, No JVD  CHEST/LUNG: Clear to auscultation bilaterally; No wheeze  HEART: Regular rate and rhythm; No murmurs, rubs, or gallops  ABDOMEN: Soft, Nontender, Nondistended; Bowel sounds present  NEURO: AAOx3, no focal weakness, 5/5 b/l extremity strength, b/l knee no arthritis, no effusion   EXTREMITIES:  2+ Peripheral Pulses, No clubbing, cyanosis, or edema, right radial dag c/d/i, + pulses  SKIN: No rashes or lesions

## 2023-07-16 NOTE — PROGRESS NOTE ADULT - ASSESSMENT
Patient is a 63 year old female with a PMHx of history of hypertension (on Ramipril), hyperlipidemia (on Simvastatin), diabetes who presents to [place] as transfer from [place] emergency department for NSTEMI. Consult called for co medical mgmt.    # Chest pain r/o ACS:  - Concern for NSTEMI  - ECG NSR  - Monitor on Tele   - s/p cardiac cath 7/13 with no significant angiographic evidence of CAD. Ventriculogram consistent with Takatsubo CM. Recommended for medical mgmt.   - Likely stress induced CM with EF of 26%  - Per Cards-> d/c Heparin gtt, ASA, statin as cath with patent cors  - CTA chest pending  - Cards following    # R/O Bronchitis:   - CXR with congestion / bronchitis  - s/p Prednisone 10 x 5 days outpatient  - Tessalon Perle 100 Q8 PRN   - Duoneb PRN     # Abdominal discomfort, Elevated T Bili:  - Abd US with Hepatomegaly and hepatic steatosis. No sonographic evidence of cholelithiasis, acute cholecystitis or dilated bile ducts.    # Leukocytosis:   - Likely due to reactivity versus bronchitis   - BCx with NGTD  - Monitor off of ABX for now   - Monitor temps/WBC    # HLD/HTN:  - C/w CV meds per Cards    # DM:  - Sliding scale   - A1C 6.6%  - Diabetic DASH diet     # Recent Death:   - Pt reports passing of her Nephew preceding her symptoms  - Psychiatry eval offered, patient declined     # GI ppx:  - Protonix    # DVT ppx:  - IPC's    [facility]  [phone]

## 2023-07-16 NOTE — PROGRESS NOTE ADULT - ASSESSMENT
Patient is a 63 year old female with a PMHx of history of hypertension (on Ramipril), hyperlipidemia (on Simvastatin), diabetes who presents to [place] as transfer from [place] emergency department for NSTEMI.  Patient reports that she found out her nephew had passed away on day of presentation and began to experience chest pain. Patient reports that her chest pain worsened around 7 PM prompting her arrival. Patient reports she she had an episode of emesis and continued chest pressure. Patient admits to abdominal discomfort. Patient has a significant family history of her Mother passing away from MI in her 60s and father with CAD. Patient admits to recent cough over the course of the past week for which she was started on Tessalon Perle for and a short course of steroids. Patient reports that she took 1 steroid dose. Reports cough with clear-yellow phlegm. Patient denies shortness of breath, palpitations.         Chest pain   Elevated Hgb   HTN  HLD  DM    Chest Pain   -he presented with left sided chest pain  underneath the left breast and this symptoms started after her nephews death:   -she had cath done and found to have low EF:  and is consistent with TAKASUBO'S disease: Currently on rx:   -Her d dimer was normal : but she is still having pain :  -she had Abg done today with mild resp alkalosis  goes along with anxiety:  However she will get CTA any way today   -Her hest xray with increased interstitial markings and had leucocytosis on admission ut was taking steroids as an outpt; :  -All her symptoms are likely explained by stress/ extreme anxiety :  -cta is still not done:  however clinical probability for pe is low  Elevated Hgb   -still slightly high: ? dry  HTN  -blood pressure is running soft at this time:   -lasix being held   HLD  -per primary team  DM;  -monitor and control :    jacob carroll

## 2023-07-17 ENCOUNTER — TRANSCRIPTION ENCOUNTER (OUTPATIENT)
Age: 63
End: 2023-07-17

## 2023-07-17 VITALS
TEMPERATURE: 98 F | RESPIRATION RATE: 18 BRPM | SYSTOLIC BLOOD PRESSURE: 114 MMHG | HEART RATE: 89 BPM | OXYGEN SATURATION: 98 % | DIASTOLIC BLOOD PRESSURE: 74 MMHG

## 2023-07-17 LAB
ANION GAP SERPL CALC-SCNC: 16 MMOL/L — SIGNIFICANT CHANGE UP (ref 5–17)
BUN SERPL-MCNC: 19 MG/DL — SIGNIFICANT CHANGE UP (ref 7–23)
CALCIUM SERPL-MCNC: 9.2 MG/DL — SIGNIFICANT CHANGE UP (ref 8.4–10.5)
CHLORIDE SERPL-SCNC: 101 MMOL/L — SIGNIFICANT CHANGE UP (ref 96–108)
CO2 SERPL-SCNC: 21 MMOL/L — LOW (ref 22–31)
CREAT SERPL-MCNC: 0.51 MG/DL — SIGNIFICANT CHANGE UP (ref 0.5–1.3)
EGFR: 104 ML/MIN/1.73M2 — SIGNIFICANT CHANGE UP
GLUCOSE BLDC GLUCOMTR-MCNC: 161 MG/DL — HIGH (ref 70–99)
GLUCOSE BLDC GLUCOMTR-MCNC: 170 MG/DL — HIGH (ref 70–99)
GLUCOSE SERPL-MCNC: 137 MG/DL — HIGH (ref 70–99)
POTASSIUM SERPL-MCNC: 3.7 MMOL/L — SIGNIFICANT CHANGE UP (ref 3.5–5.3)
POTASSIUM SERPL-SCNC: 3.7 MMOL/L — SIGNIFICANT CHANGE UP (ref 3.5–5.3)
SODIUM SERPL-SCNC: 138 MMOL/L — SIGNIFICANT CHANGE UP (ref 135–145)

## 2023-07-17 PROCEDURE — 84145 PROCALCITONIN (PCT): CPT

## 2023-07-17 PROCEDURE — 93458 L HRT ARTERY/VENTRICLE ANGIO: CPT

## 2023-07-17 PROCEDURE — C1894: CPT

## 2023-07-17 PROCEDURE — C8929: CPT

## 2023-07-17 PROCEDURE — 83036 HEMOGLOBIN GLYCOSYLATED A1C: CPT

## 2023-07-17 PROCEDURE — 84443 ASSAY THYROID STIM HORMONE: CPT

## 2023-07-17 PROCEDURE — 36415 COLL VENOUS BLD VENIPUNCTURE: CPT

## 2023-07-17 PROCEDURE — 96374 THER/PROPH/DIAG INJ IV PUSH: CPT

## 2023-07-17 PROCEDURE — 87086 URINE CULTURE/COLONY COUNT: CPT

## 2023-07-17 PROCEDURE — C1769: CPT

## 2023-07-17 PROCEDURE — 80053 COMPREHEN METABOLIC PANEL: CPT

## 2023-07-17 PROCEDURE — 85730 THROMBOPLASTIN TIME PARTIAL: CPT

## 2023-07-17 PROCEDURE — 71275 CT ANGIOGRAPHY CHEST: CPT

## 2023-07-17 PROCEDURE — 83880 ASSAY OF NATRIURETIC PEPTIDE: CPT

## 2023-07-17 PROCEDURE — 80048 BASIC METABOLIC PNL TOTAL CA: CPT

## 2023-07-17 PROCEDURE — 93005 ELECTROCARDIOGRAM TRACING: CPT

## 2023-07-17 PROCEDURE — 85610 PROTHROMBIN TIME: CPT

## 2023-07-17 PROCEDURE — 87070 CULTURE OTHR SPECIMN AEROBIC: CPT

## 2023-07-17 PROCEDURE — 85027 COMPLETE CBC AUTOMATED: CPT

## 2023-07-17 PROCEDURE — 86803 HEPATITIS C AB TEST: CPT

## 2023-07-17 PROCEDURE — 84484 ASSAY OF TROPONIN QUANT: CPT

## 2023-07-17 PROCEDURE — 85025 COMPLETE CBC W/AUTO DIFF WBC: CPT

## 2023-07-17 PROCEDURE — 87040 BLOOD CULTURE FOR BACTERIA: CPT

## 2023-07-17 PROCEDURE — 76700 US EXAM ABDOM COMPLETE: CPT

## 2023-07-17 PROCEDURE — 80061 LIPID PANEL: CPT

## 2023-07-17 PROCEDURE — 82962 GLUCOSE BLOOD TEST: CPT

## 2023-07-17 PROCEDURE — C1887: CPT

## 2023-07-17 PROCEDURE — 99285 EMERGENCY DEPT VISIT HI MDM: CPT | Mod: 25

## 2023-07-17 PROCEDURE — 36600 WITHDRAWAL OF ARTERIAL BLOOD: CPT

## 2023-07-17 PROCEDURE — 82803 BLOOD GASES ANY COMBINATION: CPT

## 2023-07-17 RX ORDER — ASPIRIN/CALCIUM CARB/MAGNESIUM 324 MG
1 TABLET ORAL
Qty: 0 | Refills: 0 | DISCHARGE

## 2023-07-17 RX ORDER — VALSARTAN 80 MG/1
0.5 TABLET ORAL
Qty: 30 | Refills: 0
Start: 2023-07-17 | End: 2023-08-15

## 2023-07-17 RX ORDER — SIMVASTATIN 20 MG/1
1 TABLET, FILM COATED ORAL
Refills: 0 | DISCHARGE

## 2023-07-17 RX ORDER — RAMIPRIL 5 MG
1 CAPSULE ORAL
Refills: 0 | DISCHARGE

## 2023-07-17 RX ORDER — FUROSEMIDE 40 MG
1 TABLET ORAL
Qty: 30 | Refills: 0
Start: 2023-07-17 | End: 2023-08-15

## 2023-07-17 RX ORDER — FUROSEMIDE 40 MG
20 TABLET ORAL DAILY
Refills: 0 | Status: DISCONTINUED | OUTPATIENT
Start: 2023-07-17 | End: 2023-07-17

## 2023-07-17 RX ORDER — METOPROLOL TARTRATE 50 MG
1 TABLET ORAL
Qty: 30 | Refills: 0
Start: 2023-07-17 | End: 2023-08-15

## 2023-07-17 RX ORDER — DAPAGLIFLOZIN 10 MG/1
1 TABLET, FILM COATED ORAL
Qty: 30 | Refills: 0
Start: 2023-07-17 | End: 2023-08-15

## 2023-07-17 RX ADMIN — PANTOPRAZOLE SODIUM 40 MILLIGRAM(S): 20 TABLET, DELAYED RELEASE ORAL at 06:13

## 2023-07-17 RX ADMIN — VALSARTAN 20 MILLIGRAM(S): 80 TABLET ORAL at 10:26

## 2023-07-17 RX ADMIN — Medication 1: at 13:24

## 2023-07-17 RX ADMIN — DAPAGLIFLOZIN 10 MILLIGRAM(S): 10 TABLET, FILM COATED ORAL at 13:25

## 2023-07-17 RX ADMIN — Medication 1: at 08:21

## 2023-07-17 RX ADMIN — Medication 20 MILLIGRAM(S): at 06:13

## 2023-07-17 NOTE — PROGRESS NOTE ADULT - SUBJECTIVE AND OBJECTIVE BOX
Date of Service: 07-17-23 @ 13:53    Patient is a 63y old  Female who presents with a chief complaint of Chest pain (17 Jul 2023 13:01)      Any change in ROS: seems OK: no sob:  no cugh : on roomair : no chest pain : no sob:     MEDICATIONS  (STANDING):  dapagliflozin 10 milliGRAM(s) Oral every 24 hours  dextrose 5%. 1000 milliLiter(s) (100 mL/Hr) IV Continuous <Continuous>  dextrose 5%. 1000 milliLiter(s) (50 mL/Hr) IV Continuous <Continuous>  dextrose 50% Injectable 25 Gram(s) IV Push once  dextrose 50% Injectable 25 Gram(s) IV Push once  dextrose 50% Injectable 12.5 Gram(s) IV Push once  furosemide    Tablet 20 milliGRAM(s) Oral daily  glucagon  Injectable 1 milliGRAM(s) IntraMuscular once  insulin lispro (ADMELOG) corrective regimen sliding scale   SubCutaneous three times a day before meals  insulin lispro (ADMELOG) corrective regimen sliding scale   SubCutaneous at bedtime  metoprolol succinate ER 25 milliGRAM(s) Oral daily  pantoprazole    Tablet 40 milliGRAM(s) Oral before breakfast  sodium chloride 0.9%. 1000 milliLiter(s) (75 mL/Hr) IV Continuous <Continuous>  valsartan 20 milliGRAM(s) Oral two times a day    MEDICATIONS  (PRN):  albuterol/ipratropium for Nebulization 3 milliLiter(s) Nebulizer every 6 hours PRN Shortness of Breath and/or Wheezing  benzonatate 100 milliGRAM(s) Oral every 8 hours PRN Cough  dextrose Oral Gel 15 Gram(s) Oral once PRN Blood Glucose LESS THAN 70 milliGRAM(s)/deciliter    Vital Signs Last 24 Hrs  T(C): 36.4 (17 Jul 2023 10:05), Max: 36.7 (16 Jul 2023 17:30)  T(F): 97.5 (17 Jul 2023 10:05), Max: 98 (16 Jul 2023 17:30)  HR: 84 (17 Jul 2023 10:05) (71 - 92)  BP: 121/76 (17 Jul 2023 10:05) (99/62 - 121/76)  BP(mean): --  RR: 18 (17 Jul 2023 10:05) (18 - 18)  SpO2: 97% (17 Jul 2023 10:05) (95% - 100%)    Parameters below as of 17 Jul 2023 10:05  Patient On (Oxygen Delivery Method): room air        I&O's Summary    16 Jul 2023 07:01  -  17 Jul 2023 07:00  --------------------------------------------------------  IN: 1260 mL / OUT: 0 mL / NET: 1260 mL    17 Jul 2023 07:01  -  17 Jul 2023 13:53  --------------------------------------------------------  IN: 0 mL / OUT: 800 mL / NET: -800 mL          Physical Exam:   GENERAL: NAD, well-groomed, well-developed  HEENT: KAYLEEN/   Atraumatic, Normocephalic  ENMT: No tonsillar erythema, exudates, or enlargement; Moist mucous membranes, Good dentition, No lesions  NECK: Supple, No JVD, Normal thyroid  CHEST/LUNG: Clear to auscultaion  CVS: Regular rate and rhythm; No murmurs, rubs, or gallops  GI: : Soft, Nontender, Nondistended; Bowel sounds present  NERVOUS SYSTEM:  Alert & Oriented X3  EXTREMITIES: - edema  LYMPH: No lymphadenopathy noted  SKIN: No rashes or lesions  ENDOCRINOLOGY: No Thyromegaly  PSYCH: Appropriate    Labs:                              15.6   11.65 )-----------( 310      ( 15 Jul 2023 00:45 )             43.9                         16.5   17.17 )-----------( 310      ( 14 Jul 2023 01:41 )             47.9     07-17    138  |  101  |  19  ----------------------------<  137<H>  3.7   |  21<L>  |  0.51  07-15    137  |  101  |  26<H>  ----------------------------<  138<H>  3.5   |  19<L>  |  0.54  07-14    137  |  99  |  15  ----------------------------<  155<H>  3.7   |  14<L>  |  0.55    Ca    9.2      17 Jul 2023 07:23    TPro  7.4  /  Alb  4.5  /  TBili  2.3<H>  /  DBili  x   /  AST  34  /  ALT  33  /  AlkPhos  79  07-14    CAPILLARY BLOOD GLUCOSE      POCT Blood Glucose.: 170 mg/dL (17 Jul 2023 13:14)  POCT Blood Glucose.: 161 mg/dL (17 Jul 2023 08:19)  POCT Blood Glucose.: 151 mg/dL (16 Jul 2023 21:39)  POCT Blood Glucose.: 141 mg/dL (16 Jul 2023 18:03)          Urinalysis Basic - ( 17 Jul 2023 07:23 )    Color: x / Appearance: x / SG: x / pH: x  Gluc: 137 mg/dL / Ketone: x  / Bili: x / Urobili: x   Blood: x / Protein: x / Nitrite: x   Leuk Esterase: x / RBC: x / WBC x   Sq Epi: x / Non Sq Epi: x / Bacteria: x      D-Dimer Assay, Quantitative: <150 ng/mL DDU (07-12 @ 22:59)  Procalcitonin, Serum: 0.27 ng/mL (07-14 @ 01:41)        RECENT CULTURES:  07-14 @ 04:57 .Sputum Sputum       Moderate polymorphonuclear leukocytes per low power field  Few Squamous epithelial cells per low power field  Moderate Gram Positive Cocci in Pairs and Chains per oil power field  Few Gram Negative Rods per oil power field  Few Gram Variable Rods per oil power field  Few Gram Negative Diplococci per oil power field           Normal Respiratory Carla present    07-14 @ 01:35 .Blood Blood         rad< from: CT Angio Chest PE Protocol w/ IV Cont (07.16.23 @ 18:41) >  FINDINGS:  CTA: The study is technically adequate with a good contrast bolus to the   pulmonary arteries. No pulmonary embolism. The great vessels are normal   in size. The heart is normal in size. No pericardial effusion.    Lungs/Airways/Pleura: The central airways are patent. No pleural   effusion. The lungs are clear.    Mediastinum/Lymph nodes: No thoracic adenopathy.    Upper Abdomen: Unremarkable.    Bones and Soft Tissues: No aggressive osseous lesions. Right breast   clips/calcifications. Old right posterior rib fractures.    IMPRESSION:  No pulmonary embolism.    --- End of Report ---            TRACE YOON M.D., Attending Radiologist  This document has been electronically signed. Jul 17 2023  8:50AM    < end of copied text >  < from: Xray Chest 1 View- PORTABLE-Urgent (07.12.23 @ 22:50) >  COMPARISON: None    Overlying EKG leadsand wires.    FINDINGS:  Heart/Vascular: The heart size, mediastinum, hilum and aorta are within   normal limits for projection.  Pulmonary: Midline trachea. There is mild increased interstitial   markings.. There is no focal consolidation or gross effusion.  Bones: There is no fracture.  Lines and catheter: None    Impression:    Increased interstitial markings may reflect congestion. Patient has   underlying history of bronchitis and this is within the differential   diagnosis.    --- End of Report ---      < end of copied text >         No growth at 72 Hours    07-13 @ 12:50 Clean Catch Clean Catch (Midstream)                >=3 organisms. Probable collection contamination.          RESPIRATORY CULTURES:          Studies  Chest X-RAY  CT SCAN Chest   Venous Dopplers: LE:   CT Abdomen  Others

## 2023-07-17 NOTE — DISCHARGE NOTE PROVIDER - NSDCFUADDAPPT_GEN_ALL_CORE_FT
APPTS ARE READY TO BE MADE: [x ] YES    Best Family or Patient Contact (if needed):    Additional Information about above appointments (if needed):    1:   2:   3:     Other comments or requests:    APPTS ARE READY TO BE MADE: [x ] YES    Best Family or Patient Contact (if needed):    Additional Information about above appointments (if needed):    1:   2:   3:     Other comments or requests:   Patient was previously scheduled with Dr. Lul Zimmerman on 07/18/2023.  APPTS ARE READY TO BE MADE: [x ] YES    Best Family or Patient Contact (if needed):    Additional Information about above appointments (if needed):    1:   2:   3:     Other comments or requests:   Patient was previously scheduled with Dr. Lul Zimmerman on 07/18/2023.     Patient advised they are established with a different provider not listed on the referral and will follow up on their own.

## 2023-07-17 NOTE — DISCHARGE NOTE PROVIDER - NSDCCPCAREPLAN_GEN_ALL_CORE_FT
PRINCIPAL DISCHARGE DIAGNOSIS  Diagnosis: NSTEMI (non-ST elevation myocardial infarction)  Assessment and Plan of Treatment: Status post left heart catheterization with normal coronaries, no blockages. No need to continue with anticoagulation, aspirin, or statin at this time.   If you experience chest pain at home, call you doctor or call 911      SECONDARY DISCHARGE DIAGNOSES  Diagnosis: HF (heart failure)  Assessment and Plan of Treatment: Heart failure (HF) is a condition that does not allow your heart to fill or pump properly. Not enough oxygen in your blood gets to your organs and tissues. HF can occur in the right side, the left side, or both lower chambers of your heart. HF is often caused by damage or injury to your heart. The damage may be caused by heart attack, other heart conditions, or high blood pressure. It is important to manage your health to improve your quality of life. HF can be worsened by heavy alcohol use, smoking, diabetes that is not controlled, or obesity.  Call 911 for:  Squeezing, pressure, or pain in your chest that lasts longer than 5 minutes or returns  Discomfort or pain in your back, neck, jaw, stomach, or arm  Trouble breathing  Nausea or vomiting  Lightheadedness or a sudden cold sweat, especially with chest pain or trouble breathing.  Seek care immediately if:  You gain 3 or more pounds (1.4 kg) in a day  Your heartbeat is fast, slow, or uneven all the time.  NEW MEDICATIONS:  1. Farxiga  2. Valsartan  3. Metoprolol    Diagnosis: Hepatomegaly  Assessment and Plan of Treatment: incidental finding of elevated bilirubin with mildly enlarged liver.  Follow up with primary care doctor and gastroenterologist outpatient     PRINCIPAL DISCHARGE DIAGNOSIS  Diagnosis: NSTEMI (non-ST elevation myocardial infarction)  Assessment and Plan of Treatment: Status post left heart catheterization with normal coronaries, no blockages. No need to continue with anticoagulation, aspirin, or statin at this time. New cardiomyopathy.  If you experience chest pain at home, call you doctor or call 911      SECONDARY DISCHARGE DIAGNOSES  Diagnosis: HF (heart failure)  Assessment and Plan of Treatment: Heart failure (HF) is a condition that does not allow your heart to fill or pump properly. Not enough oxygen in your blood gets to your organs and tissues. HF can occur in the right side, the left side, or both lower chambers of your heart. HF is often caused by damage or injury to your heart. The damage may be caused by heart attack, other heart conditions, or high blood pressure. It is important to manage your health to improve your quality of life. HF can be worsened by heavy alcohol use, smoking, diabetes that is not controlled, or obesity.  Call 911 for:  Squeezing, pressure, or pain in your chest that lasts longer than 5 minutes or returns  Discomfort or pain in your back, neck, jaw, stomach, or arm  Trouble breathing  Nausea or vomiting  Lightheadedness or a sudden cold sweat, especially with chest pain or trouble breathing.  NEW MEDICATIONS:  1. Farxiga  2. Valsartan (Hold if bp lower than 100)  3. Metoprolol (Hold if BP lower than 100)    Diagnosis: Hepatomegaly  Assessment and Plan of Treatment: incidental finding of elevated bilirubin with mildly enlarged liver.  Follow up with primary care doctor and gastroenterologist outpatient

## 2023-07-17 NOTE — DISCHARGE NOTE NURSING/CASE MANAGEMENT/SOCIAL WORK - PATIENT PORTAL LINK FT
You can access the FollowMyHealth Patient Portal offered by [facility] by registering at the following website: [email or web address]. By joining [facility]’s FollowMyHealth portal, you will also be able to view your health information using other applications (apps) compatible with our system.

## 2023-07-17 NOTE — DISCHARGE NOTE PROVIDER - PROVIDER TOKENS
PROVIDER:[TOKEN:[[id number]],FOLLOWUP:[1 week]],PROVIDER:[TOKEN:[[id number]],FOLLOWUP:[2 weeks]]

## 2023-07-17 NOTE — PROGRESS NOTE ADULT - ASSESSMENT
Patient is a 63 year old female with a PMHx of history of hypertension (on Ramipril), hyperlipidemia (on Simvastatin), diabetes who presents to [place] as transfer from [place] emergency department for NSTEMI.  Patient reports that she found out her nephew had passed away on day of presentation and began to experience chest pain. Patient reports that her chest pain worsened around 7 PM prompting her arrival. Patient reports she she had an episode of emesis and continued chest pressure. Patient admits to abdominal discomfort. Patient has a significant family history of her Mother passing away from MI in her 60s and father with CAD. Patient admits to recent cough over the course of the past week for which she was started on Tessalon Perle for and a short course of steroids. Patient reports that she took 1 steroid dose. Reports cough with clear-yellow phlegm. Patient denies shortness of breath, palpitations.         Chest pain   Elevated Hgb   HTN  HLD  DM    Chest Pain   -he presented with left sided chest pain  underneath the left breast and this symptoms started after her nephews death:   -she had cath done and found to have low EF:  and is consistent with TAKASUBO'S disease: Currently on rx:   -Her d dimer was normal : but she is still having pain :  -she had Abg done today with mild resp alkalosis  goes along with anxiety:  However she will get CTA any way today   -Her hest xray with increased interstitial markings and had leucocytosis on admission ut was taking steroids as an outpt; :  -All her symptoms are likely explained by stress/ extreme anxiety :  -cta is still not done:  however clinical probability for pe is low  -CTA done now:  no abnormality:  stable from pulm point of view:   Elevated Hgb   -still slightly high: ? dry  HTN  -blood pressure is running soft at this time:   -lasix being held   HLD  -per primary team  DM;  -monitor and control :    jacob acp

## 2023-07-17 NOTE — DISCHARGE NOTE PROVIDER - HOSPITAL COURSE
Patient is a 63 year old female with a PMHx of history of hypertension (on Ramipril), hyperlipidemia (on Simvastatin), diabetes who presents to [place] as transfer from [place] emergency department for NSTEMI. Consult called for co medical mgmt.    # Chest pain r/o ACS:  - s/p cardiac cath 7/13 with no significant angiographic evidence of CAD. Ventriculogram consistent with Takatsubo CM. Recommended for medical mgmt.   - Likely stress induced CM with EF of 26%  - Per Cards-> d/c Heparin gtt, ASA, statin as cath with patent cors  - CTA negative  - Cards following    # R/O Bronchitis:   - CXR with congestion / bronchitis  - s/p Prednisone 10 x 5 days outpatient  - Tessalon Perle 100 Q8 PRN   - Duoneb PRN     # Abdominal discomfort, Elevated T Bili:  - Abd US with Hepatomegaly and hepatic steatosis. No sonographic evidence of cholelithiasis, acute cholecystitis or dilated bile ducts.    # Leukocytosis:   - Likely due to reactivity versus bronchitis   - BCx with NGTD  - Monitor off of ABX for now   - Monitor temps/WBC    # HLD/HTN:  - C/w CV meds per Cards    # DM:  - Sliding scale   - A1C 6.6%  - Diabetic DASH diet     # Recent Death:   - Pt reports passing of her Nephew preceding her symptoms  - Psychiatry eval offered, patient declined     # GI ppx:  - Protonix    # DVT ppx:  - IPC's  pt cleared for dc by Dr. Lawson

## 2023-07-17 NOTE — PROGRESS NOTE ADULT - ASSESSMENT
Patient is a 63 year old female with a PMHx of history of hypertension (on Ramipril), hyperlipidemia (on Simvastatin), diabetes who presents to [place] as transfer from [place] emergency department for NSTEMI. Consult called for co medical mgmt.    # Chest pain r/o ACS:  - s/p cardiac cath 7/13 with no significant angiographic evidence of CAD. Ventriculogram consistent with Takatsubo CM. Recommended for medical mgmt.   - Likely stress induced CM with EF of 26%  - Per Cards-> d/c Heparin gtt, ASA, statin as cath with patent cors  - CTA negative  - Cards following    # R/O Bronchitis:   - CXR with congestion / bronchitis  - s/p Prednisone 10 x 5 days outpatient  - Tessalon Perle 100 Q8 PRN   - Duoneb PRN     # Abdominal discomfort, Elevated T Bili:  - Abd US with Hepatomegaly and hepatic steatosis. No sonographic evidence of cholelithiasis, acute cholecystitis or dilated bile ducts.    # Leukocytosis:   - Likely due to reactivity versus bronchitis   - BCx with NGTD  - Monitor off of ABX for now   - Monitor temps/WBC    # HLD/HTN:  - C/w CV meds per Cards    # DM:  - Sliding scale   - A1C 6.6%  - Diabetic DASH diet     # Recent Death:   - Pt reports passing of her Nephew preceding her symptoms  - Psychiatry eval offered, patient declined     # GI ppx:  - Protonix    # DVT ppx:  - IPC's    * Discharge planning *    [employer]  [phone]

## 2023-07-17 NOTE — PROGRESS NOTE ADULT - SUBJECTIVE AND OBJECTIVE BOX
DATE OF SERVICE: 23 @ 15:38    Patient is a 63y old  Female who presents with a chief complaint of Chest pain (2023 13:52)      INTERVAL HISTORY: Feels ok.     REVIEW OF SYSTEMS:  CONSTITUTIONAL: No weakness  EYES/ENT: No visual changes;  No throat pain   NECK: No pain or stiffness  RESPIRATORY: No cough, wheezing; No shortness of breath  CARDIOVASCULAR: No chest pain or palpitations  GASTROINTESTINAL: No abdominal  pain. No nausea, vomiting, or hematemesis  GENITOURINARY: No dysuria, frequency or hematuria  NEUROLOGICAL: No stroke like symptoms  SKIN: No rashes    	  MEDICATIONS:  furosemide    Tablet 20 milliGRAM(s) Oral daily  metoprolol succinate ER 25 milliGRAM(s) Oral daily  valsartan 20 milliGRAM(s) Oral two times a day        PHYSICAL EXAM:  T(C): 36.4 (23 @ 14:26), Max: 36.7 (23 @ 17:30)  HR: 89 (23 @ 14:26) (71 - 92)  BP: 114/74 (23 @ 14:26) (99/62 - 121/76)  RR: 18 (23 @ 14:26) (18 - 18)  SpO2: 98% (23 @ 14:26) (95% - 100%)  Wt(kg): --  I&O's Summary    2023 07:  -  2023 07:00  --------------------------------------------------------  IN: 1260 mL / OUT: 0 mL / NET: 1260 mL    2023 07:  -  2023 15:38  --------------------------------------------------------  IN: 560 mL / OUT: 800 mL / NET: -240 mL          Appearance: In no distress	  HEENT:    PERRL, EOMI	  Cardiovascular:  S1 S2, No JVD  Respiratory: Lungs clear to auscultation	  Gastrointestinal:  Soft, Non-tender, + BS	  Vascularature:  No edema of LE  Psychiatric: Appropriate affect   Neuro: no acute focal deficits               138  |  101  |  19  ----------------------------<  137<H>  3.7   |  21<L>  |  0.51    Ca    9.2      2023 07:23          Labs personally reviewed      ASSESSMENT/PLAN: 	     63-year-old female history of hypertension, hyperlipidemia, diabetes presenting to [place] emergency department as transfer from [place] emergency department for NSTEMI.  Patient states found out nephew had  earlier today and began having chest pain that worsened around 7 PM tonight had episodes of emesis and continued chest pressure.  Also endorsing abdominal discomfort during this same timeframe.  Patient's mother passed away at 64 from MI. Her father also has hx of CAD/PCIs.  Found to have elevated troponin.  Denies recent shortness of breath, palpitations, edema, orthopnea or syncope.     1. Chest Pain  - ECG NSR  - Trop 5134 -- 492  - D-Dimer negative  - Multiple risk factors for CAD- FHx of CAD (mother and father), HTN, HLD, DM  - d/c Heparin gtt, ASA, statin as cath with patent cors  - Likely stress induced CM with EF of 26%    2. Hypertension  - Valsartan in place of Ramipril to allow transition to Entresto    3. HLD  - Check lipid panel  - c/w simvastatin 20mg PO daily    4. Acute systolic HF  - Stress induced CM with EF of 26%  - GDMT - started Toprol 25mg, Farxiga 10mg, Valsartan 20mg BID (transition to Entresto as OP)  - d/c IV Lasix  - transition to Lasix 20mg PO daily    5. Acidosis - Bicarb trended from 15-->14 -->19  - Per primary team          RANJANA Han-ANA MARIA Andrews DO [facility]  Cardiovascular Medicine  [address], [address]  Available through call or text on [employer]  Office: [phone]   DATE OF SERVICE: 23 @ 15:38    Patient is a 63y old  Female who presents with a chief complaint of Chest pain (2023 13:52)      INTERVAL HISTORY: Feels ok.     REVIEW OF SYSTEMS:  CONSTITUTIONAL: No weakness  EYES/ENT: No visual changes;  No throat pain   NECK: No pain or stiffness  RESPIRATORY: No cough, wheezing; No shortness of breath  CARDIOVASCULAR: No chest pain or palpitations  GASTROINTESTINAL: No abdominal  pain. No nausea, vomiting, or hematemesis  GENITOURINARY: No dysuria, frequency or hematuria  NEUROLOGICAL: No stroke like symptoms  SKIN: No rashes    	  MEDICATIONS:  furosemide    Tablet 20 milliGRAM(s) Oral daily  metoprolol succinate ER 25 milliGRAM(s) Oral daily  valsartan 20 milliGRAM(s) Oral two times a day        PHYSICAL EXAM:  T(C): 36.4 (23 @ 14:26), Max: 36.7 (23 @ 17:30)  HR: 89 (23 @ 14:26) (71 - 92)  BP: 114/74 (23 @ 14:26) (99/62 - 121/76)  RR: 18 (23 @ 14:26) (18 - 18)  SpO2: 98% (23 @ 14:26) (95% - 100%)  Wt(kg): --  I&O's Summary    2023 07:  -  2023 07:00  --------------------------------------------------------  IN: 1260 mL / OUT: 0 mL / NET: 1260 mL    2023 07:  -  2023 15:38  --------------------------------------------------------  IN: 560 mL / OUT: 800 mL / NET: -240 mL          Appearance: In no distress	  HEENT:    PERRL, EOMI	  Cardiovascular:  S1 S2, No JVD  Respiratory: Lungs clear to auscultation	  Gastrointestinal:  Soft, Non-tender, + BS	  Vascularature:  No edema of LE  Psychiatric: Appropriate affect   Neuro: no acute focal deficits               138  |  101  |  19  ----------------------------<  137<H>  3.7   |  21<L>  |  0.51    Ca    9.2      2023 07:23          Labs personally reviewed      ASSESSMENT/PLAN: 	     63-year-old female history of hypertension, hyperlipidemia, diabetes presenting to [place] emergency department as transfer from [place] emergency department for NSTEMI.  Patient states found out nephew had  earlier today and began having chest pain that worsened around 7 PM tonight had episodes of emesis and continued chest pressure.  Also endorsing abdominal discomfort during this same timeframe.  Patient's mother passed away at 64 from MI. Her father also has hx of CAD/PCIs.  Found to have elevated troponin.  Denies recent shortness of breath, palpitations, edema, orthopnea or syncope.     1. Chest Pain  - ECG NSR  - Trop 5134 -- 492  - D-Dimer negative  - Multiple risk factors for CAD- FHx of CAD (mother and father), HTN, HLD, DM  - d/c Heparin gtt, ASA, statin as cath with patent cors  - Likely stress induced CM with EF of 26%    2. Hypertension  - Valsartan in place of Ramipril to allow transition to Entresto    3. HLD  - LDL 33  - c/w simvastatin 20mg PO daily    4. Acute systolic HF  - Stress induced CM with EF of 26%  - GDMT - started Toprol 25mg, Farxiga 10mg, Valsartan 20mg BID (transition to Entresto as OP)  - d/c IV Lasix  - transition to Lasix 20mg PO daily    5. Acidosis - Bicarb trended from 15-->14 -->19  - Per primary team          RANJANA Han-ANA MARIA Andrews DO [facility]  Cardiovascular Medicine  [address], [address]  Available through call or text on [employer]  Office: [phone]   DATE OF SERVICE: 23 @ 15:38    Patient is a 63y old  Female who presents with a chief complaint of Chest pain (2023 13:52)      INTERVAL HISTORY: Feels ok.     REVIEW OF SYSTEMS:  CONSTITUTIONAL: No weakness  EYES/ENT: No visual changes;  No throat pain   NECK: No pain or stiffness  RESPIRATORY: No cough, wheezing; No shortness of breath  CARDIOVASCULAR: No chest pain or palpitations  GASTROINTESTINAL: No abdominal  pain. No nausea, vomiting, or hematemesis  GENITOURINARY: No dysuria, frequency or hematuria  NEUROLOGICAL: No stroke like symptoms  SKIN: No rashes    	  MEDICATIONS:  furosemide    Tablet 20 milliGRAM(s) Oral daily  metoprolol succinate ER 25 milliGRAM(s) Oral daily  valsartan 20 milliGRAM(s) Oral two times a day        PHYSICAL EXAM:  T(C): 36.4 (23 @ 14:26), Max: 36.7 (23 @ 17:30)  HR: 89 (23 @ 14:26) (71 - 92)  BP: 114/74 (23 @ 14:26) (99/62 - 121/76)  RR: 18 (23 @ 14:26) (18 - 18)  SpO2: 98% (23 @ 14:26) (95% - 100%)  Wt(kg): --  I&O's Summary    2023 07:  -  2023 07:00  --------------------------------------------------------  IN: 1260 mL / OUT: 0 mL / NET: 1260 mL    2023 07:  -  2023 15:38  --------------------------------------------------------  IN: 560 mL / OUT: 800 mL / NET: -240 mL          Appearance: In no distress	  HEENT:    PERRL, EOMI	  Cardiovascular:  S1 S2, No JVD  Respiratory: Lungs clear to auscultation	  Gastrointestinal:  Soft, Non-tender, + BS	  Vascularature:  No edema of LE  Psychiatric: Appropriate affect   Neuro: no acute focal deficits               138  |  101  |  19  ----------------------------<  137<H>  3.7   |  21<L>  |  0.51    Ca    9.2      2023 07:23          Labs personally reviewed      ASSESSMENT/PLAN: 	     63-year-old female history of hypertension, hyperlipidemia, diabetes presenting to [place] emergency department as transfer from [place] emergency department for NSTEMI.  Patient states found out nephew had  earlier today and began having chest pain that worsened around 7 PM tonight had episodes of emesis and continued chest pressure.  Also endorsing abdominal discomfort during this same timeframe.  Patient's mother passed away at 64 from MI. Her father also has hx of CAD/PCIs.  Found to have elevated troponin.  Denies recent shortness of breath, palpitations, edema, orthopnea or syncope.     1. Chest Pain  - ECG NSR  - Trop 5134 -- 492  - D-Dimer negative  - Multiple risk factors for CAD- FHx of CAD (mother and father), HTN, HLD, DM  - d/c Heparin gtt, ASA, statin as cath with patent cors  - Likely stress induced CM with EF of 26%    2. Hypertension  - Valsartan in place of Ramipril to allow transition to Entresto    3. HLD  - LDL 33  - c/w simvastatin 20mg PO daily    4. Acute systolic HF  - Stress induced CM with EF of 26%  - GDMT - started Toprol 25mg, Farxiga 10mg, Valsartan 20mg BID (transition to Entresto as OP)  - transitioned to Lasix 20mg PO daily    5. Acidosis - Bicarb trended from 15-->14 -->19  - Resolved           Geri Ibanez, RANJANA-ANA MARIA Andrews DO [facility]  Cardiovascular Medicine  [address], [address]  Available through call or text on [employer]  Office: [phone]

## 2023-07-17 NOTE — PROGRESS NOTE ADULT - SUBJECTIVE AND OBJECTIVE BOX
SUBJECTIVE / OVERNIGHT EVENTS:      Patient seen and examined at bedside. No events noted overnight. Resting comfortably in bed. Eager for discharge    --------------------------------------------------------------------------------------------  LABS:    07-17    138  |  101  |  19  ----------------------------<  137<H>  3.7   |  21<L>  |  0.51    Ca    9.2      17 Jul 2023 07:23        CAPILLARY BLOOD GLUCOSE      POCT Blood Glucose.: 161 mg/dL (17 Jul 2023 08:19)  POCT Blood Glucose.: 151 mg/dL (16 Jul 2023 21:39)  POCT Blood Glucose.: 141 mg/dL (16 Jul 2023 18:03)  POCT Blood Glucose.: 162 mg/dL (16 Jul 2023 12:32)        Urinalysis Basic - ( 17 Jul 2023 07:23 )    Color: x / Appearance: x / SG: x / pH: x  Gluc: 137 mg/dL / Ketone: x  / Bili: x / Urobili: x   Blood: x / Protein: x / Nitrite: x   Leuk Esterase: x / RBC: x / WBC x   Sq Epi: x / Non Sq Epi: x / Bacteria: x        RADIOLOGY & ADDITIONAL TESTS:< from: CT Angio Chest PE Protocol w/ IV Cont (07.16.23 @ 18:41) >  IMPRESSION:  No pulmonary embolism.    --- End of Report ---    < end of copied text >      Imaging Personally Reviewed:  [x] YES  [ ] NO    Consultant(s) Notes Reviewed:  [x] YES  [ ] NO    MEDICATIONS  (STANDING):  dapagliflozin 10 milliGRAM(s) Oral every 24 hours  dextrose 5%. 1000 milliLiter(s) (100 mL/Hr) IV Continuous <Continuous>  dextrose 5%. 1000 milliLiter(s) (50 mL/Hr) IV Continuous <Continuous>  dextrose 50% Injectable 25 Gram(s) IV Push once  dextrose 50% Injectable 25 Gram(s) IV Push once  dextrose 50% Injectable 12.5 Gram(s) IV Push once  furosemide    Tablet 20 milliGRAM(s) Oral daily  glucagon  Injectable 1 milliGRAM(s) IntraMuscular once  insulin lispro (ADMELOG) corrective regimen sliding scale   SubCutaneous three times a day before meals  insulin lispro (ADMELOG) corrective regimen sliding scale   SubCutaneous at bedtime  metoprolol succinate ER 25 milliGRAM(s) Oral daily  pantoprazole    Tablet 40 milliGRAM(s) Oral before breakfast  sodium chloride 0.9%. 1000 milliLiter(s) (75 mL/Hr) IV Continuous <Continuous>  valsartan 20 milliGRAM(s) Oral two times a day    MEDICATIONS  (PRN):  albuterol/ipratropium for Nebulization 3 milliLiter(s) Nebulizer every 6 hours PRN Shortness of Breath and/or Wheezing  benzonatate 100 milliGRAM(s) Oral every 8 hours PRN Cough  dextrose Oral Gel 15 Gram(s) Oral once PRN Blood Glucose LESS THAN 70 milliGRAM(s)/deciliter      Care Discussed with Consultants/Other Providers [x] YES  [ ] NO    Vital Signs Last 24 Hrs  T(C): 36.4 (17 Jul 2023 10:05), Max: 36.7 (16 Jul 2023 13:38)  T(F): 97.5 (17 Jul 2023 10:05), Max: 98 (16 Jul 2023 13:38)  HR: 84 (17 Jul 2023 10:05) (71 - 92)  BP: 121/76 (17 Jul 2023 10:05) (99/62 - 121/76)  BP(mean): --  RR: 18 (17 Jul 2023 10:05) (18 - 18)  SpO2: 97% (17 Jul 2023 10:05) (95% - 100%)    Parameters below as of 17 Jul 2023 10:05  Patient On (Oxygen Delivery Method): room air      I&O's Summary    16 Jul 2023 07:01  -  17 Jul 2023 07:00  --------------------------------------------------------  IN: 1260 mL / OUT: 0 mL / NET: 1260 mL    17 Jul 2023 07:01  -  17 Jul 2023 11:16  --------------------------------------------------------  IN: 0 mL / OUT: 800 mL / NET: -800 mL      PHYSICAL EXAM:  GENERAL: NAD, well-developed, comfortable  HEAD:  Atraumatic, Normocephalic  EYES: EOMI, PERRLA, conjunctiva and sclera clear  NECK: Supple, No JVD  CHEST/LUNG: Clear to auscultation bilaterally; No wheeze  HEART: Regular rate and rhythm; No murmurs, rubs, or gallops  ABDOMEN: Soft, Nontender, Nondistended; Bowel sounds present  NEURO: AAOx3, no focal weakness, 5/5 b/l extremity strength, b/l knee no arthritis, no effusion   EXTREMITIES:  2+ Peripheral Pulses, No clubbing, cyanosis, or edema, right radial dag c/d/i, + pulses  SKIN: No rashes or lesions

## 2023-07-17 NOTE — DISCHARGE NOTE PROVIDER - CARE PROVIDER_API CALL
Neptali Dawkins  Internal Medicine  [address], [address]  [place] [address]  Phone: [phone]  Fax: [phone]  Follow Up Time: 1 week    Elmer Andrews  Cardiovascular Disease  [address] [place] [address], [address]  [place] [address]  Phone: [phone]  Fax: [phone]  Follow Up Time: 2 weeks

## 2023-07-17 NOTE — DISCHARGE NOTE PROVIDER - NSDCMRMEDTOKEN_GEN_ALL_CORE_FT
aspirin 81 mg oral tablet: 1 tab(s) orally once a day  Daily Simón oral tablet: 1 tab(s) orally once a day  Invokana 300 mg oral tablet: 1 orally once a day  MetFORMIN (Eqv-Glumetza) 500 mg oral tablet, extended release: 4 tab(s) orally once a day  ramipril 2.5 mg oral capsule: 1 cap(s) orally once a day  Rybelsus 7 mg oral tablet: 1 tab(s) orally once a day (in the morning)  simvastatin 5 mg oral tablet: 1 tab(s) orally once a day   Daily Simón oral tablet: 1 tab(s) orally once a day  dapagliflozin 10 mg oral tablet: 1 tab(s) orally every 24 hours  Diovan 40 mg oral tablet: 0.5 tab(s) orally 2 times a day  furosemide 20 mg oral tablet: 1 tab(s) orally once a day  Invokana 300 mg oral tablet: 1 orally once a day  MetFORMIN (Eqv-Glumetza) 500 mg oral tablet, extended release: 4 tab(s) orally once a day  metoprolol succinate 25 mg oral tablet, extended release: 1 tab(s) orally once a day  Rybelsus 7 mg oral tablet: 1 tab(s) orally once a day (in the morning)   Daily Simón oral tablet: 1 tab(s) orally once a day  dapagliflozin 10 mg oral tablet: 1 tab(s) orally every 24 hours  Diovan 40 mg oral tablet: 0.5 tab(s) orally 2 times a day  furosemide 20 mg oral tablet: 1 tab(s) orally once a day  Invokana 300 mg oral tablet: 1 orally once a day  MetFORMIN (Eqv-Glumetza) 500 mg oral tablet, extended release: 4 tab(s) orally once a day  metoprolol succinate 25 mg oral tablet, extended release: 1 tab(s) orally once a day  nebulizer: 1  Rybelsus 7 mg oral tablet: 1 tab(s) orally once a day (in the morning)

## 2023-07-17 NOTE — PROGRESS NOTE ADULT - PROVIDER SPECIALTY LIST ADULT
Cardiology
Internal Medicine
Internal Medicine
Pulmonology
Cardiology
Internal Medicine
Internal Medicine
Pulmonology

## 2023-07-19 LAB
CULTURE RESULTS: SIGNIFICANT CHANGE UP
CULTURE RESULTS: SIGNIFICANT CHANGE UP
SPECIMEN SOURCE: SIGNIFICANT CHANGE UP
SPECIMEN SOURCE: SIGNIFICANT CHANGE UP

## 2023-10-27 PROBLEM — J40 BRONCHITIS, NOT SPECIFIED AS ACUTE OR CHRONIC: Chronic | Status: ACTIVE | Noted: 2023-07-12

## 2023-10-27 PROBLEM — K21.9 GASTRO-ESOPHAGEAL REFLUX DISEASE WITHOUT ESOPHAGITIS: Chronic | Status: ACTIVE | Noted: 2023-07-12

## 2023-11-27 ENCOUNTER — OUTPATIENT (OUTPATIENT)
Dept: OUTPATIENT SERVICES | Facility: HOSPITAL | Age: 63
LOS: 1 days | End: 2023-11-27
Payer: COMMERCIAL

## 2023-11-27 ENCOUNTER — APPOINTMENT (OUTPATIENT)
Dept: ULTRASOUND IMAGING | Facility: CLINIC | Age: 63
End: 2023-11-27
Payer: COMMERCIAL

## 2023-11-27 DIAGNOSIS — Z98.891 HISTORY OF UTERINE SCAR FROM PREVIOUS SURGERY: Chronic | ICD-10-CM

## 2023-11-27 DIAGNOSIS — N64.89 OTHER SPECIFIED DISORDERS OF BREAST: ICD-10-CM

## 2023-11-27 DIAGNOSIS — N63.21 UNSPECIFIED LUMP IN THE LEFT BREAST, UPPER OUTER QUADRANT: ICD-10-CM

## 2023-11-27 PROCEDURE — 76642 ULTRASOUND BREAST LIMITED: CPT | Mod: 26,50

## 2023-11-27 PROCEDURE — 76642 ULTRASOUND BREAST LIMITED: CPT

## 2023-12-04 ENCOUNTER — APPOINTMENT (OUTPATIENT)
Dept: ORTHOPEDIC SURGERY | Facility: CLINIC | Age: 63
End: 2023-12-04
Payer: COMMERCIAL

## 2023-12-04 DIAGNOSIS — M18.12 UNILATERAL PRIMARY OSTEOARTHRITIS OF FIRST CARPOMETACARPAL JOINT, LEFT HAND: ICD-10-CM

## 2023-12-04 DIAGNOSIS — M54.50 LOW BACK PAIN, UNSPECIFIED: ICD-10-CM

## 2023-12-04 DIAGNOSIS — G56.32 LESION OF RADIAL NERVE, LEFT UPPER LIMB: ICD-10-CM

## 2023-12-04 PROCEDURE — 73130 X-RAY EXAM OF HAND: CPT | Mod: LT

## 2023-12-04 PROCEDURE — 99212 OFFICE O/P EST SF 10 MIN: CPT | Mod: 25

## 2024-04-07 ENCOUNTER — APPOINTMENT (OUTPATIENT)
Dept: ORTHOPEDIC SURGERY | Facility: CLINIC | Age: 64
End: 2024-04-07
Payer: COMMERCIAL

## 2024-04-07 DIAGNOSIS — R07.81 PLEURODYNIA: ICD-10-CM

## 2024-04-07 PROCEDURE — 99203 OFFICE O/P NEW LOW 30 MIN: CPT

## 2024-04-07 PROCEDURE — 71100 X-RAY EXAM RIBS UNI 2 VIEWS: CPT

## 2024-04-07 NOTE — ASSESSMENT
[FreeTextEntry1] :  A/P R anterior rib contusion - sling offered, pt deferred - ice - NSAIDs prn - strict return to ED precautions with difficulty breathing or chest pain - f/u orthopedics 1 week

## 2024-04-07 NOTE — IMAGING
[de-identified] :  PE R side chest: no ecchymosis, skin intact, no crepitus, +tenderness to anterior ribs. Equal chest rise. Breathing unlabored. [Right] : right rib [No bony abnormalities] : No bony abnormalities

## 2024-04-07 NOTE — HISTORY OF PRESENT ILLNESS
[5] : 5 [4] : 4 [Stabbing] : stabbing [Constant] : constant [de-identified] : 64 y/o F with R sided chest/rib pain s/p fall into chair last week. denies chest pain or shortness of breath. pain with deep inhalation or cough. She has not tried any interventions. denies head strike or LOC. Overall, improving. [] : no [FreeTextEntry5] : Pt initially fell on an the arm of a chair about a week ago and has been having chest pain when sneezing coughing and with and without movement

## 2024-04-22 ENCOUNTER — OFFICE (OUTPATIENT)
Dept: URBAN - METROPOLITAN AREA CLINIC 34 | Facility: CLINIC | Age: 64
Setting detail: OPHTHALMOLOGY
End: 2024-04-22
Payer: COMMERCIAL

## 2024-04-22 DIAGNOSIS — H01.001: ICD-10-CM

## 2024-04-22 DIAGNOSIS — H01.004: ICD-10-CM

## 2024-04-22 DIAGNOSIS — E11.9: ICD-10-CM

## 2024-04-22 DIAGNOSIS — H25.13: ICD-10-CM

## 2024-04-22 DIAGNOSIS — H35.40: ICD-10-CM

## 2024-04-22 DIAGNOSIS — H00.022: ICD-10-CM

## 2024-04-22 PROCEDURE — 99204 OFFICE O/P NEW MOD 45 MIN: CPT | Performed by: OPHTHALMOLOGY

## 2024-04-22 ASSESSMENT — LID EXAM ASSESSMENTS
OD_BLEPHARITIS: RUL T
OD_COMMENTS: TELANGIECTASIA, SHORTENED/MISSING GLANDS, HYPEREMIA
OS_BLEPHARITIS: LUL T
OS_COMMENTS: TELANGIECTASIA, HYPEREMIA
OD_MEIBOMITIS: RLL 1+

## 2024-05-06 ENCOUNTER — OFFICE (OUTPATIENT)
Dept: URBAN - METROPOLITAN AREA CLINIC 34 | Facility: CLINIC | Age: 64
Setting detail: OPHTHALMOLOGY
End: 2024-05-06
Payer: MEDICARE

## 2024-05-06 DIAGNOSIS — H25.13: ICD-10-CM

## 2024-05-06 DIAGNOSIS — H25.11: ICD-10-CM

## 2024-05-06 PROCEDURE — 92136 OPHTHALMIC BIOMETRY: CPT | Mod: 26,RT | Performed by: OPHTHALMOLOGY

## 2024-05-06 PROCEDURE — 99214 OFFICE O/P EST MOD 30 MIN: CPT | Performed by: OPHTHALMOLOGY

## 2024-05-06 PROCEDURE — 92136 OPHTHALMIC BIOMETRY: CPT | Mod: TC | Performed by: OPHTHALMOLOGY

## 2024-05-06 ASSESSMENT — CONFRONTATIONAL VISUAL FIELD TEST (CVF)
OS_FINDINGS: FULL
OD_FINDINGS: FULL

## 2024-05-23 ENCOUNTER — ASC (OUTPATIENT)
Dept: URBAN - METROPOLITAN AREA SURGERY 8 | Facility: SURGERY | Age: 64
Setting detail: OPHTHALMOLOGY
End: 2024-05-23
Payer: MEDICARE

## 2024-05-23 DIAGNOSIS — H25.11: ICD-10-CM

## 2024-05-23 DIAGNOSIS — H52.211: ICD-10-CM

## 2024-05-23 PROCEDURE — V2788LAL LIGHT ADJUSTABLE LENS (LAL): Performed by: OPHTHALMOLOGY

## 2024-05-23 PROCEDURE — FEMTO FEMTOSECOND LASER: Mod: GY | Performed by: OPHTHALMOLOGY

## 2024-05-23 PROCEDURE — 68841 INSJ RX ELUT IMPLT LAC CANAL: CPT | Mod: RT | Performed by: OPHTHALMOLOGY

## 2024-05-23 PROCEDURE — A9270 NON-COVERED ITEM OR SERVICE: HCPCS | Mod: GY | Performed by: OPHTHALMOLOGY

## 2024-05-23 PROCEDURE — 66984 XCAPSL CTRC RMVL W/O ECP: CPT | Mod: RT | Performed by: OPHTHALMOLOGY

## 2024-05-24 ENCOUNTER — OFFICE (OUTPATIENT)
Dept: URBAN - METROPOLITAN AREA CLINIC 35 | Facility: CLINIC | Age: 64
Setting detail: OPHTHALMOLOGY
End: 2024-05-24
Payer: MEDICARE

## 2024-05-24 DIAGNOSIS — Z96.1: ICD-10-CM

## 2024-05-24 DIAGNOSIS — H25.12: ICD-10-CM

## 2024-05-24 PROCEDURE — 92136 OPHTHALMIC BIOMETRY: CPT | Mod: 26,LT | Performed by: OPHTHALMOLOGY

## 2024-05-24 PROCEDURE — 99024 POSTOP FOLLOW-UP VISIT: CPT | Performed by: OPHTHALMOLOGY

## 2024-05-24 ASSESSMENT — LID EXAM ASSESSMENTS
OS_COMMENTS: TELANGIECTASIA, HYPEREMIA
OD_BLEPHARITIS: RUL T
OD_MEIBOMITIS: RLL 1+
OS_BLEPHARITIS: LUL T

## 2024-05-24 ASSESSMENT — CONFRONTATIONAL VISUAL FIELD TEST (CVF)
OD_FINDINGS: FULL
OS_FINDINGS: FULL

## 2024-05-31 PROBLEM — H25.12 CATARACT SENILE NUCLEAR SCLEROSIS; LEFT EYE: Status: RESOLVED | Noted: 2024-05-24 | Resolved: 2024-05-31

## 2024-05-31 PROBLEM — Z96.1 PSEUDOPHAKIA: Status: ACTIVE | Noted: 2024-05-24

## 2024-05-31 PROBLEM — H01.001 BLEPHARITIS; RIGHT UPPER LID, , LEFT UPPER LID: Status: ACTIVE | Noted: 2024-05-31

## 2024-05-31 PROBLEM — H01.004 BLEPHARITIS; RIGHT UPPER LID, , LEFT UPPER LID: Status: ACTIVE | Noted: 2024-05-31

## 2024-05-31 PROBLEM — H11.32 SUBCONJUNCTIVAL HEMORRHAGE; LEFT EYE: Status: ACTIVE | Noted: 2024-05-31

## 2024-07-29 ENCOUNTER — DOCTOR'S OFFICE (OUTPATIENT)
Age: 64
Setting detail: OPHTHALMOLOGY
End: 2024-07-29
Payer: MEDICARE

## 2024-07-29 DIAGNOSIS — H26.493: ICD-10-CM

## 2024-07-29 DIAGNOSIS — Z96.1: ICD-10-CM

## 2024-07-29 DIAGNOSIS — H43.811: ICD-10-CM

## 2024-07-29 PROCEDURE — 92134 CPTRZ OPH DX IMG PST SGM RTA: CPT | Performed by: OPHTHALMOLOGY

## 2024-07-29 PROCEDURE — 99024 POSTOP FOLLOW-UP VISIT: CPT | Performed by: OPHTHALMOLOGY

## 2024-07-29 ASSESSMENT — CONFRONTATIONAL VISUAL FIELD TEST (CVF)
OS_FINDINGS: FULL
OD_FINDINGS: FULL

## 2024-11-11 ENCOUNTER — RESULT REVIEW (OUTPATIENT)
Age: 64
End: 2024-11-11

## 2024-11-11 ENCOUNTER — APPOINTMENT (OUTPATIENT)
Dept: ULTRASOUND IMAGING | Facility: CLINIC | Age: 64
End: 2024-11-11
Payer: MEDICARE

## 2024-11-11 ENCOUNTER — APPOINTMENT (OUTPATIENT)
Dept: MAMMOGRAPHY | Facility: CLINIC | Age: 64
End: 2024-11-11
Payer: MEDICARE

## 2024-11-11 ENCOUNTER — OUTPATIENT (OUTPATIENT)
Dept: OUTPATIENT SERVICES | Facility: HOSPITAL | Age: 64
LOS: 1 days | End: 2024-11-11
Payer: MEDICARE

## 2024-11-11 DIAGNOSIS — Z98.891 HISTORY OF UTERINE SCAR FROM PREVIOUS SURGERY: Chronic | ICD-10-CM

## 2024-11-11 DIAGNOSIS — N64.1 FAT NECROSIS OF BREAST: ICD-10-CM

## 2024-11-11 PROCEDURE — 77063 BREAST TOMOSYNTHESIS BI: CPT | Mod: 26

## 2024-11-11 PROCEDURE — 77067 SCR MAMMO BI INCL CAD: CPT | Mod: 26

## 2024-11-11 PROCEDURE — 76641 ULTRASOUND BREAST COMPLETE: CPT | Mod: 26,50,GZ

## 2024-11-20 PROCEDURE — 76641 ULTRASOUND BREAST COMPLETE: CPT

## 2024-11-20 PROCEDURE — 77067 SCR MAMMO BI INCL CAD: CPT

## 2024-11-20 PROCEDURE — 77063 BREAST TOMOSYNTHESIS BI: CPT

## 2024-12-23 ENCOUNTER — APPOINTMENT (OUTPATIENT)
Dept: OBGYN | Facility: CLINIC | Age: 64
End: 2024-12-23

## 2024-12-23 ENCOUNTER — NON-APPOINTMENT (OUTPATIENT)
Age: 64
End: 2024-12-23

## 2024-12-23 VITALS
SYSTOLIC BLOOD PRESSURE: 138 MMHG | HEIGHT: 61 IN | WEIGHT: 166 LBS | DIASTOLIC BLOOD PRESSURE: 80 MMHG | BODY MASS INDEX: 31.34 KG/M2

## 2024-12-23 DIAGNOSIS — Z01.411 ENCOUNTER FOR GYNECOLOGICAL EXAMINATION (GENERAL) (ROUTINE) WITH ABNORMAL FINDINGS: ICD-10-CM

## 2024-12-23 PROCEDURE — G0328 FECAL BLOOD SCRN IMMUNOASSAY: CPT | Mod: QW

## 2024-12-23 PROCEDURE — G0101: CPT

## 2024-12-23 PROCEDURE — G0444 DEPRESSION SCREEN ANNUAL: CPT | Mod: 59

## 2024-12-26 LAB — HPV HIGH+LOW RISK DNA PNL CVX: NOT DETECTED

## 2024-12-30 LAB — CYTOLOGY CVX/VAG DOC THIN PREP: ABNORMAL

## 2025-02-17 ENCOUNTER — DOCTOR'S OFFICE (OUTPATIENT)
Facility: LOCATION | Age: 65
Setting detail: OPHTHALMOLOGY
End: 2025-02-17
Payer: MEDICARE

## 2025-02-17 DIAGNOSIS — H01.004: ICD-10-CM

## 2025-02-17 DIAGNOSIS — Z96.1: ICD-10-CM

## 2025-02-17 DIAGNOSIS — H26.493: ICD-10-CM

## 2025-02-17 DIAGNOSIS — E11.9: ICD-10-CM

## 2025-02-17 DIAGNOSIS — H43.813: ICD-10-CM

## 2025-02-17 DIAGNOSIS — H00.022: ICD-10-CM

## 2025-02-17 DIAGNOSIS — H01.001: ICD-10-CM

## 2025-02-17 DIAGNOSIS — H35.40: ICD-10-CM

## 2025-02-17 PROCEDURE — 92014 COMPRE OPH EXAM EST PT 1/>: CPT | Performed by: OPHTHALMOLOGY

## 2025-02-17 ASSESSMENT — REFRACTION_CURRENTRX
OD_ADD: +2.00
OS_VPRISM_DIRECTION: BF
OD_SPHERE: -9.00
OD_AXIS: 144
OD_CYLINDER: +0.25
OS_ADD: +1.75
OS_AXIS: 050
OS_OVR_VA: 20/
OD_OVR_VA: 20/
OS_CYLINDER: +0.25
OS_OVR_VA: 20/
OD_VPRISM_DIRECTION: BF
OS_SPHERE: -8.50
OD_OVR_VA: 20/

## 2025-02-17 ASSESSMENT — KERATOMETRY
OD_K2POWER_DIOPTERS: 48.25
OD_K1POWER_DIOPTERS: 47.75
OS_K1POWER_DIOPTERS: 47.25
OD_AXISANGLE_DEGREES: 092
OS_K2POWER_DIOPTERS: 48.00
OS_AXISANGLE_DEGREES: 129
METHOD_AUTO_MANUAL: AUTO

## 2025-02-17 ASSESSMENT — LID EXAM ASSESSMENTS
OS_BLEPHARITIS: LUL T
OD_BLEPHARITIS: RUL T
OD_COMMENTS: TELANGIECTASIA, SHORTENED/MISSING GLANDS, HYPEREMIA
OD_MEIBOMITIS: RLL 1+
OS_COMMENTS: TELANGIECTASIA, HYPEREMIA

## 2025-02-17 ASSESSMENT — REFRACTION_AUTOREFRACTION
OS_SPHERE: -0.50
OD_CYLINDER: +0.25
OS_CYLINDER: +0.25
OD_AXIS: 129
OS_AXIS: 010
OD_SPHERE: -0.25

## 2025-02-17 ASSESSMENT — REFRACTION_MANIFEST
OD_SPHERE: PLANO
OS_SPHERE: PLANO
OD_AXIS: 150
OD_CYLINDER: +0.25
OS_CYLINDER: +0.25
OD_VA1: 20/25-2
OS_VA1: 20/20-2
OS_AXIS: 055

## 2025-02-17 ASSESSMENT — CONFRONTATIONAL VISUAL FIELD TEST (CVF)
OS_FINDINGS: FULL
OD_FINDINGS: FULL

## 2025-02-17 ASSESSMENT — TONOMETRY
OS_IOP_MMHG: 18
OD_IOP_MMHG: 18

## 2025-02-17 ASSESSMENT — VISUAL ACUITY
OS_BCVA: 20/20-1
OD_BCVA: 20/20

## 2025-06-25 ENCOUNTER — NON-APPOINTMENT (OUTPATIENT)
Age: 65
End: 2025-06-25

## 2025-08-04 ENCOUNTER — RX ONLY (RX ONLY)
Age: 65
End: 2025-08-04

## 2025-08-04 ENCOUNTER — DOCTOR'S OFFICE (OUTPATIENT)
Facility: LOCATION | Age: 65
Setting detail: OPHTHALMOLOGY
End: 2025-08-04
Payer: MEDICARE

## 2025-08-04 DIAGNOSIS — Z96.1: ICD-10-CM

## 2025-08-04 DIAGNOSIS — H26.493: ICD-10-CM

## 2025-08-04 PROCEDURE — 99213 OFFICE O/P EST LOW 20 MIN: CPT | Performed by: OPHTHALMOLOGY

## 2025-08-04 ASSESSMENT — REFRACTION_CURRENTRX
OS_SPHERE: -8.50
OD_SPHERE: -9.00
OD_OVR_VA: 20/
OS_CYLINDER: +0.25
OS_ADD: +1.75
OD_OVR_VA: 20/
OS_OVR_VA: 20/
OD_AXIS: 144
OD_VPRISM_DIRECTION: BF
OD_ADD: +2.00
OS_OVR_VA: 20/
OD_CYLINDER: +0.25
OS_AXIS: 050
OS_VPRISM_DIRECTION: BF

## 2025-08-04 ASSESSMENT — REFRACTION_AUTOREFRACTION
OS_CYLINDER: +0.25
OS_AXIS: 168
OS_SPHERE: -0.25
OD_CYLINDER: +0.25
OD_SPHERE: -0.25
OD_AXIS: 140

## 2025-08-04 ASSESSMENT — KERATOMETRY
OS_K1POWER_DIOPTERS: 47.50
OD_K1POWER_DIOPTERS: 47.50
OD_K2POWER_DIOPTERS: 48.00
METHOD_AUTO_MANUAL: AUTO
OS_AXISANGLE_DEGREES: 107
OD_AXISANGLE_DEGREES: 110
OS_K2POWER_DIOPTERS: 48.00

## 2025-08-04 ASSESSMENT — TONOMETRY
OD_IOP_MMHG: 16
OS_IOP_MMHG: 14

## 2025-08-04 ASSESSMENT — REFRACTION_MANIFEST
OS_CYLINDER: +0.25
OD_SPHERE: PLANO
OS_VA1: 20/20-2
OS_AXIS: 055
OD_CYLINDER: +0.25
OD_VA1: 20/25-2
OD_AXIS: 150
OS_SPHERE: PLANO

## 2025-08-04 ASSESSMENT — VISUAL ACUITY
OS_BCVA: 20/20-1
OD_BCVA: 20/20